# Patient Record
Sex: MALE | Race: BLACK OR AFRICAN AMERICAN | NOT HISPANIC OR LATINO | Employment: OTHER | URBAN - METROPOLITAN AREA
[De-identification: names, ages, dates, MRNs, and addresses within clinical notes are randomized per-mention and may not be internally consistent; named-entity substitution may affect disease eponyms.]

---

## 2017-01-31 ENCOUNTER — ALLSCRIPTS OFFICE VISIT (OUTPATIENT)
Dept: OTHER | Facility: OTHER | Age: 57
End: 2017-01-31

## 2017-06-30 ENCOUNTER — ALLSCRIPTS OFFICE VISIT (OUTPATIENT)
Dept: OTHER | Facility: OTHER | Age: 57
End: 2017-06-30

## 2017-07-13 ENCOUNTER — ALLSCRIPTS OFFICE VISIT (OUTPATIENT)
Dept: OTHER | Facility: OTHER | Age: 57
End: 2017-07-13

## 2017-07-13 DIAGNOSIS — Z45.02 ENCOUNTER FOR ADJUSTMENT OR MANAGEMENT OF AUTOMATIC IMPLANTABLE CARDIOVERTER-DEFIBRILLATOR: ICD-10-CM

## 2017-08-01 ENCOUNTER — ALLSCRIPTS OFFICE VISIT (OUTPATIENT)
Dept: OTHER | Facility: OTHER | Age: 57
End: 2017-08-01

## 2017-08-03 ENCOUNTER — HOSPITAL ENCOUNTER (OUTPATIENT)
Dept: NON INVASIVE DIAGNOSTICS | Facility: CLINIC | Age: 57
Discharge: HOME | End: 2017-08-03
Payer: MEDICARE

## 2017-08-03 DIAGNOSIS — Z45.02 ENCOUNTER FOR ADJUSTMENT OR MANAGEMENT OF AUTOMATIC IMPLANTABLE CARDIOVERTER-DEFIBRILLATOR: ICD-10-CM

## 2017-08-03 PROCEDURE — 93306 TTE W/DOPPLER COMPLETE: CPT

## 2017-08-11 ENCOUNTER — TELEPHONE (OUTPATIENT)
Dept: SURGERY | Facility: HOSPITAL | Age: 57
End: 2017-08-11

## 2017-08-11 RX ORDER — SODIUM CHLORIDE 9 MG/ML
50 INJECTION, SOLUTION INTRAVENOUS CONTINUOUS
Status: CANCELLED | OUTPATIENT
Start: 2017-08-11

## 2017-08-14 ENCOUNTER — ANESTHESIA EVENT (OUTPATIENT)
Dept: NON INVASIVE DIAGNOSTICS | Facility: HOSPITAL | Age: 57
End: 2017-08-14

## 2017-08-14 ENCOUNTER — HOSPITAL ENCOUNTER (OUTPATIENT)
Dept: NON INVASIVE DIAGNOSTICS | Facility: HOSPITAL | Age: 57
Discharge: NURSING, REHAB OR LONG-TERM CARE | End: 2017-08-14
Attending: INTERNAL MEDICINE | Admitting: INTERNAL MEDICINE
Payer: MEDICARE

## 2017-08-14 ENCOUNTER — ANESTHESIA (OUTPATIENT)
Dept: NON INVASIVE DIAGNOSTICS | Facility: HOSPITAL | Age: 57
End: 2017-08-14

## 2017-08-14 ENCOUNTER — HOSPITAL ENCOUNTER (OUTPATIENT)
Dept: RADIOLOGY | Facility: HOSPITAL | Age: 57
Discharge: HOME | End: 2017-08-14
Payer: MEDICARE

## 2017-08-14 VITALS
TEMPERATURE: 95.4 F | RESPIRATION RATE: 18 BRPM | HEART RATE: 54 BPM | DIASTOLIC BLOOD PRESSURE: 80 MMHG | SYSTOLIC BLOOD PRESSURE: 132 MMHG | HEIGHT: 73 IN | OXYGEN SATURATION: 98 %

## 2017-08-14 DIAGNOSIS — Z45.02 ENCOUNTER DUE TO AICD AT END OF BATTERY LIFE: ICD-10-CM

## 2017-08-14 LAB
BASE EXCESS BLDA CALC-SCNC: 4 MMOL/L (ref -2–3)
BASOPHILS # BLD AUTO: 0.02 THOUSANDS/ΜL (ref 0–0.1)
BASOPHILS NFR BLD AUTO: 0 % (ref 0–1)
CA-I BLD-SCNC: 1.29 MMOL/L (ref 1.12–1.32)
EOSINOPHIL # BLD AUTO: 0.05 THOUSAND/ΜL (ref 0–0.61)
EOSINOPHIL NFR BLD AUTO: 1 % (ref 0–6)
ERYTHROCYTE [DISTWIDTH] IN BLOOD BY AUTOMATED COUNT: 14.4 % (ref 11.6–15.1)
GLUCOSE SERPL-MCNC: 108 MG/DL (ref 65–140)
GLUCOSE SERPL-MCNC: 95 MG/DL (ref 65–140)
HCO3 BLDA-SCNC: 31.1 MMOL/L (ref 24–30)
HCT VFR BLD AUTO: 43.1 % (ref 36.5–49.3)
HCT VFR BLD CALC: 45 % (ref 36.5–49.3)
HGB BLD-MCNC: 15.3 G/DL (ref 12–17)
HGB BLDA-MCNC: 15.3 G/DL (ref 12–17)
LYMPHOCYTES # BLD AUTO: 1.8 THOUSANDS/ΜL (ref 0.6–4.47)
LYMPHOCYTES NFR BLD AUTO: 37 % (ref 14–44)
MCH RBC QN AUTO: 31.7 PG (ref 26.8–34.3)
MCHC RBC AUTO-ENTMCNC: 35.5 G/DL (ref 31.4–37.4)
MCV RBC AUTO: 89 FL (ref 82–98)
MONOCYTES # BLD AUTO: 0.67 THOUSAND/ΜL (ref 0.17–1.22)
MONOCYTES NFR BLD AUTO: 14 % (ref 4–12)
NEUTROPHILS # BLD AUTO: 2.27 THOUSANDS/ΜL (ref 1.85–7.62)
NEUTS SEG NFR BLD AUTO: 48 % (ref 43–75)
NRBC BLD AUTO-RTO: 0 /100 WBCS
PCO2 BLD: 33 MMOL/L (ref 21–32)
PCO2 BLD: 55.5 MM HG (ref 42–50)
PH BLD: 7.36 [PH] (ref 7.3–7.4)
PLATELET # BLD AUTO: 139 THOUSANDS/UL (ref 149–390)
PMV BLD AUTO: 12.5 FL (ref 8.9–12.7)
PO2 BLD: 21 MM HG (ref 35–45)
POTASSIUM BLD-SCNC: 4.3 MMOL/L (ref 3.5–5.3)
RBC # BLD AUTO: 4.83 MILLION/UL (ref 3.88–5.62)
SAO2 % BLD FROM PO2: 32 % (ref 95–98)
SODIUM BLD-SCNC: 142 MMOL/L (ref 136–145)
SPECIMEN SOURCE: ABNORMAL
WBC # BLD AUTO: 4.81 THOUSAND/UL (ref 4.31–10.16)

## 2017-08-14 PROCEDURE — 85014 HEMATOCRIT: CPT

## 2017-08-14 PROCEDURE — 85025 COMPLETE CBC W/AUTO DIFF WBC: CPT | Performed by: INTERNAL MEDICINE

## 2017-08-14 PROCEDURE — 82803 BLOOD GASES ANY COMBINATION: CPT

## 2017-08-14 PROCEDURE — 33263 RMVL & RPLCMT DFB GEN 2 LEAD: CPT | Performed by: INTERNAL MEDICINE

## 2017-08-14 PROCEDURE — 84295 ASSAY OF SERUM SODIUM: CPT

## 2017-08-14 PROCEDURE — 71010 HB CHEST X-RAY 1 VIEW FRONTAL (PORTABLE): CPT

## 2017-08-14 PROCEDURE — 82947 ASSAY GLUCOSE BLOOD QUANT: CPT

## 2017-08-14 PROCEDURE — 84132 ASSAY OF SERUM POTASSIUM: CPT

## 2017-08-14 PROCEDURE — 82948 REAGENT STRIP/BLOOD GLUCOSE: CPT

## 2017-08-14 PROCEDURE — C1721 AICD, DUAL CHAMBER: HCPCS

## 2017-08-14 PROCEDURE — 82330 ASSAY OF CALCIUM: CPT

## 2017-08-14 PROCEDURE — 93005 ELECTROCARDIOGRAM TRACING: CPT | Performed by: PHYSICIAN ASSISTANT

## 2017-08-14 RX ORDER — METFORMIN HYDROCHLORIDE 500 MG/1
500 TABLET, EXTENDED RELEASE ORAL 2 TIMES DAILY
Status: DISCONTINUED | OUTPATIENT
Start: 2017-08-15 | End: 2017-08-14 | Stop reason: CLARIF

## 2017-08-14 RX ORDER — ONDANSETRON 2 MG/ML
4 INJECTION INTRAMUSCULAR; INTRAVENOUS ONCE AS NEEDED
Status: DISCONTINUED | OUTPATIENT
Start: 2017-08-14 | End: 2017-08-14 | Stop reason: HOSPADM

## 2017-08-14 RX ORDER — ATORVASTATIN CALCIUM 20 MG/1
5 TABLET, FILM COATED ORAL DAILY
COMMUNITY
End: 2018-08-28 | Stop reason: SDUPTHER

## 2017-08-14 RX ORDER — FAMOTIDINE 40 MG/5ML
20 POWDER, FOR SUSPENSION ORAL 2 TIMES DAILY
Status: DISCONTINUED | OUTPATIENT
Start: 2017-08-14 | End: 2017-08-14 | Stop reason: HOSPADM

## 2017-08-14 RX ORDER — SODIUM CHLORIDE 9 MG/ML
50 INJECTION, SOLUTION INTRAVENOUS CONTINUOUS
Status: DISCONTINUED | OUTPATIENT
Start: 2017-08-14 | End: 2017-08-14

## 2017-08-14 RX ORDER — ACETAMINOPHEN 325 MG/1
650 TABLET ORAL EVERY 4 HOURS PRN
Status: DISCONTINUED | OUTPATIENT
Start: 2017-08-14 | End: 2017-08-14 | Stop reason: HOSPADM

## 2017-08-14 RX ORDER — TRAMADOL HYDROCHLORIDE 50 MG/1
50 TABLET ORAL EVERY 4 HOURS PRN
Status: DISCONTINUED | OUTPATIENT
Start: 2017-08-14 | End: 2017-08-14 | Stop reason: HOSPADM

## 2017-08-14 RX ORDER — ACETAMINOPHEN 325 MG/1
650 TABLET ORAL EVERY 6 HOURS PRN
Qty: 30 TABLET | Refills: 0
Start: 2017-08-14

## 2017-08-14 RX ORDER — MIDAZOLAM HYDROCHLORIDE 1 MG/ML
INJECTION INTRAMUSCULAR; INTRAVENOUS AS NEEDED
Status: DISCONTINUED | OUTPATIENT
Start: 2017-08-14 | End: 2017-08-14 | Stop reason: SURG

## 2017-08-14 RX ORDER — FAMOTIDINE 40 MG/5ML
20 POWDER, FOR SUSPENSION ORAL 2 TIMES DAILY
Qty: 50 ML | Refills: 0
Start: 2017-08-14 | End: 2018-02-21

## 2017-08-14 RX ORDER — ATORVASTATIN CALCIUM 10 MG/1
20 TABLET, FILM COATED ORAL
Status: DISCONTINUED | OUTPATIENT
Start: 2017-08-14 | End: 2017-08-14 | Stop reason: HOSPADM

## 2017-08-14 RX ORDER — GENTAMICIN SULFATE 40 MG/ML
INJECTION, SOLUTION INTRAMUSCULAR; INTRAVENOUS CODE/TRAUMA/SEDATION MEDICATION
Status: COMPLETED | OUTPATIENT
Start: 2017-08-14 | End: 2017-08-14

## 2017-08-14 RX ORDER — LIDOCAINE HYDROCHLORIDE 10 MG/ML
INJECTION, SOLUTION INFILTRATION; PERINEURAL AS NEEDED
Status: DISCONTINUED | OUTPATIENT
Start: 2017-08-14 | End: 2017-08-14 | Stop reason: SURG

## 2017-08-14 RX ORDER — ONDANSETRON 2 MG/ML
INJECTION INTRAMUSCULAR; INTRAVENOUS AS NEEDED
Status: DISCONTINUED | OUTPATIENT
Start: 2017-08-14 | End: 2017-08-14 | Stop reason: SURG

## 2017-08-14 RX ORDER — ATORVASTATIN CALCIUM 10 MG/1
20 TABLET, FILM COATED ORAL DAILY
Status: DISCONTINUED | OUTPATIENT
Start: 2017-08-14 | End: 2017-08-14

## 2017-08-14 RX ORDER — RANITIDINE 15 MG/ML
75 SOLUTION ORAL 2 TIMES DAILY
Status: DISCONTINUED | OUTPATIENT
Start: 2017-08-14 | End: 2017-08-14 | Stop reason: CLARIF

## 2017-08-14 RX ORDER — GABAPENTIN 250 MG/5ML
300 SOLUTION ORAL DAILY
Status: DISCONTINUED | OUTPATIENT
Start: 2017-08-14 | End: 2017-08-14 | Stop reason: HOSPADM

## 2017-08-14 RX ORDER — WARFARIN SODIUM 4 MG/1
4 TABLET ORAL
COMMUNITY

## 2017-08-14 RX ORDER — METFORMIN HYDROCHLORIDE EXTENDED-RELEASE TABLETS 500 MG/1
250 TABLET, FILM COATED, EXTENDED RELEASE ORAL 2 TIMES DAILY
COMMUNITY

## 2017-08-14 RX ORDER — LIDOCAINE HYDROCHLORIDE 10 MG/ML
INJECTION, SOLUTION INFILTRATION; PERINEURAL CODE/TRAUMA/SEDATION MEDICATION
Status: COMPLETED | OUTPATIENT
Start: 2017-08-14 | End: 2017-08-14

## 2017-08-14 RX ORDER — GABAPENTIN 250 MG/5ML
6 SOLUTION ORAL DAILY
COMMUNITY

## 2017-08-14 RX ORDER — PROPOFOL 10 MG/ML
INJECTION, EMULSION INTRAVENOUS AS NEEDED
Status: DISCONTINUED | OUTPATIENT
Start: 2017-08-14 | End: 2017-08-14 | Stop reason: SURG

## 2017-08-14 RX ORDER — RANITIDINE 15 MG/ML
5 SOLUTION ORAL 2 TIMES DAILY
COMMUNITY
End: 2017-08-14 | Stop reason: HOSPADM

## 2017-08-14 RX ORDER — WARFARIN SODIUM 2.5 MG/1
2.5 TABLET ORAL
COMMUNITY

## 2017-08-14 RX ADMIN — PROPOFOL 160 MG: 10 INJECTION, EMULSION INTRAVENOUS at 08:24

## 2017-08-14 RX ADMIN — CEFAZOLIN SODIUM 2000 MG: 2 SOLUTION INTRAVENOUS at 08:42

## 2017-08-14 RX ADMIN — SODIUM CHLORIDE 50 ML/HR: 0.9 INJECTION, SOLUTION INTRAVENOUS at 07:29

## 2017-08-14 RX ADMIN — LIDOCAINE HYDROCHLORIDE 16 ML: 10 INJECTION, SOLUTION INFILTRATION; PERINEURAL at 08:47

## 2017-08-14 RX ADMIN — GENTAMICIN SULFATE 80 MG: 40 INJECTION, SOLUTION INTRAMUSCULAR; INTRAVENOUS at 09:27

## 2017-08-14 RX ADMIN — LIDOCAINE HYDROCHLORIDE 60 MG: 10 INJECTION, SOLUTION INFILTRATION; PERINEURAL at 08:24

## 2017-08-14 RX ADMIN — MIDAZOLAM HYDROCHLORIDE 2 MG: 1 INJECTION, SOLUTION INTRAMUSCULAR; INTRAVENOUS at 10:01

## 2017-08-14 RX ADMIN — ONDANSETRON HYDROCHLORIDE 4 MG: 2 INJECTION, SOLUTION INTRAVENOUS at 09:51

## 2017-08-15 LAB
ATRIAL RATE: 300 BPM
QRS AXIS: -52 DEGREES
QRSD INTERVAL: 100 MS
QT INTERVAL: 410 MS
QTC INTERVAL: 451 MS
T WAVE AXIS: 54 DEGREES
VENTRICULAR RATE: 73 BPM

## 2017-09-07 ENCOUNTER — ALLSCRIPTS OFFICE VISIT (OUTPATIENT)
Dept: OTHER | Facility: OTHER | Age: 57
End: 2017-09-07

## 2017-12-08 ENCOUNTER — GENERIC CONVERSION - ENCOUNTER (OUTPATIENT)
Dept: OTHER | Facility: OTHER | Age: 57
End: 2017-12-08

## 2017-12-08 ENCOUNTER — ALLSCRIPTS OFFICE VISIT (OUTPATIENT)
Dept: OTHER | Facility: OTHER | Age: 57
End: 2017-12-08

## 2018-01-13 VITALS
HEART RATE: 55 BPM | HEIGHT: 73 IN | BODY MASS INDEX: 28.76 KG/M2 | SYSTOLIC BLOOD PRESSURE: 110 MMHG | WEIGHT: 217 LBS | DIASTOLIC BLOOD PRESSURE: 70 MMHG

## 2018-01-13 NOTE — PROGRESS NOTES
Assessment  Assessed    1  Atrial fibrillation (427 31) (I48 91)   2  Cardiomyopathy (425 4) (I42 9)   3  Hyperlipidemia (272 4) (E78 5)   4  Implantable Cardioverter-Defibrillator   5  Ventricular tachycardia (427 1) (I47 2)    Plan  Atrial fibrillation, Cardiomyopathy    · Follow-up visit in 6 months Evaluation and Treatment  Follow-up  Status: Hold For -  Scheduling  Requested for: 05Zra8167   Ordered; For: Atrial fibrillation, Cardiomyopathy; Ordered By: Soledad Addison Performed:  Due: 68ZJQ4494    Discussion/Summary  Cardiology Discussion Summary Free Text Note Form [facility] Chap[facility]: It is my impression that the patient appears to be compensated with the diagnosis of a non-ischemic cardiomyopathy  He is not requiring diuretics  His optivol readings have been normal He is in atrial fibrillation but is on warfarin for stroke prophylaxis  He will be maintained on both beta blockers and ACE inhibitors in light of his left ventricular systolic dysfunction  He is no longer on amiodarone for suppression of ventricular tachycardia  I am not sure why this was stopped  He had no VT on his recent interrogation and considering the risks of the drug we will forego restarting it at this time  If he develops VT or shocks I can restart it  He will remain on atorvastatin for hyperlipidemia  I will see him again in 6 months time  Chief Complaint  Chief Complaint Free Text Note Form: 6 moth f/u-for dilated cardiomyopathy which is nonischemic,, ventricular tachycardia with AICD  in situ and chronic atrial fibrillation and embolic stroke hemiparesis and aphasia   Chief Complaint Chronic Condition [facility]: Patient is here today for follow up of chronic conditions described in HPI  History of Present Illness  Cardiology HPI Free Text Note Form [facility]: Since his last visit he denies chest pain, shortness of breath, edema, palpitations or lightheadedness  He is somewhat limited in his history        Review of Systems  Cardiology Male ROS:     Cardiac: rhythm problems, but no chest pain, no fainting/blackouts, no heart murmur present, no signs of swelling and no palpitations present  Skin: No complaints of nonhealing sores or skin rash  Genitourinary: No complaints of recurrent urinary tract infections, frequent urination at night, difficult urination, blood in urine, kidney stones, loss of bladder control, no kidney or prostate problems, no erectile dysfunction  Psychological: No complaints of feeling depressed, anxiety, panic attacks, or difficulty concentrating  General: No complaints of trouble sleeping, lack of energy, fatigue, appetite changes, weight changes, fever, frequent infections, or night sweats  Respiratory: No complaints of shortness of breath, cough with sputum, or wheezing  HEENT: No complaints of serious problems, hearing problems, nose problems, throat problems, or snoring  Gastrointestinal: No complaints of liver problems, nausea, vomiting, heartburn, constipation, bloody stools, diarrhea, problems swallowing, adbominal pain, or rectal bleeding  Hematologic: No complaints of bleeding disorders, anemia, blood clots, or excessive brusing  Neurological: No complaints of numbness, tingling, dizziness, weakness, seizures, headaches, syncope or fainting, AM fatigue, daytime sleepiness, no witnessed apnea episodes  Musculoskeletal: No complaints of arthritis, back pain, or painfull swelling  ROS Reviewed:   ROS reviewed  Active Problems  Problems    1  Atrial fibrillation (427 31) (I48 91)   2  Cardiomyopathy (425 4) (I42 9)   3  Chest pain (786 50) (R07 9)   4  Gastric ulcer (531 90) (K25 9)   5  Hyperlipidemia (272 4) (E78 5)   6  Implantable Cardioverter-Defibrillator   7  Shortness of breath (786 05) (R06 02)   8  Stroke syndrome (434 91) (I63 9)   9  Ventricular tachycardia (427 1) (I47 2)    Past Medical History  Problems    1  History of Cirrhosis (571 5) (K74 60)   2   History of bipolar disorder (V11 1) (Z86 59)   3  History of gastroesophageal reflux (GERD) (V12 79) (Z87 19)   4  History of gastrointestinal hemorrhage (V12 79) (Z87 19)   5  History of Pulmonary Embolism (V12 55)   6  History of Stroke Syndrome (436)  Active Problems And Past Medical History Reviewed: The active problems and past medical history were reviewed and updated today  Surgical History  Problems    1  History of Implantable Cardioverter-Defibrillator   2  Implantable Cardioverter-Defibrillator   3  History of Interruption Inferior Vena Cava [place] Filter Placement   4  History of Knee Surgery   5  History of Umbilical Hernia Repair  Surgical History Reviewed: The surgical history was reviewed and updated today  Family History  Family History    1  Denied: Family history of Acute Myocardial Infarction   2  Family history of Denial Of Any Significant Medical History   3  Denied: Family history of Reported Family History Of Early Sudden Deaths   4  Denied: Family history of Reported Family History Of Heart Disease  Family History Reviewed: The family history was reviewed and updated today  Social History  Problems    · Former smoker (V15 82) (Q56 444)   · No alcohol use   · Recovering Alcoholic   · Tobacco use (305 1) (Z72 0)  Social History Reviewed: The social history was reviewed and updated today  The social history was reviewed and is unchanged  Current Meds   1  Acetaminophen 325 MG Oral Tablet; TAKE 1 TO 2 TABLETS EVERY 4 HOURS AS   NEEDED; Therapy: 97DAT7333 to Recorded   2  Atorvastatin Calcium 20 MG Oral Tablet; 1 tablet daily; Therapy: 68PQX2287 to Recorded   3  Bisacodyl 10 MG Rectal Suppository; INSERT 1 SUPPOSITORY RECTALLY ONCE DAILY; Therapy: 03VRW7052 to Recorded   4  Chlorophyll 3-0 6 MG Oral Tablet; Therapy: 79MIR1675 to Recorded   5  Daily Value Multivitamin Oral Tablet; Take 1 tablet daily Recorded   6  Ipratropium Bromide 0 02 % Inhalation Solution;    Therapy: 36MJB2873 to Recorded   7  Lisinopril 2 5 MG Oral Tablet; Therapy: 86CYK1850 to Recorded   8  Loratadine 10 MG Oral Tablet; 1 tablet daily; Therapy: 62IGU5500 to Recorded   9  MetFORMIN HCl - 500 MG Oral Tablet; 0 5 tablets twice daily; Therapy: 24AER7911 to Recorded   10  Metoprolol Tartrate 25 MG Oral Tablet; 1 TABLET TWICE DAILY; Therapy: 23MXP2858 to Recorded   11  Milk of Magnesia 1200 MG/15ML Oral Suspension; Therapy: 55FJK1720 to Recorded   12  Multivitamins Oral Capsule; Therapy: 94NRQ9734 to Recorded   13  Neurontin 250 MG/5ML Oral Solution; 8ML every evening; Therapy: 11QGZ0403 to Recorded   14  Neurontin 250 MG/5ML Oral Solution; Therapy: 66VYD1356 to Recorded   15  Ranitidine HCl - 15 MG/ML Oral Syrup; Therapy: 62EFQ5949 to Recorded   16  Sertraline HCl - 25 MG Oral Tablet; TAKE 1 TABLET DAILY; Therapy: 49KUM8735 to Recorded   17  Warfarin Sodium 5 MG Oral Tablet; tke daily as directed by MD;    Therapy: 40XFY2049 to (Hemant Miner)  Requested for: 08Apr2014; Last    Rx:08Apr2014 Ordered  Medication List Reviewed: The medication list was reviewed and updated today  Allergies  Medication    1  No Known Drug Allergies    Vitals  Vital Signs [Data Includes: Current Encounter]    Recorded: 12Jan2016 10:27AM   Heart Rate 72, L Radial   Pulse Quality Normal, L Radial   Respiration 18   Respiration Quality Normal   Systolic 368, LUE, Sitting   Diastolic 72, LUE, Sitting   Height 6 ft 1 in   Weight 221 lb 6 oz   BMI Calculated 29 21   BSA Calculated 2 25     Physical Exam    Constitutional   General appearance: Abnormal   obese middle aged black male with aphasis  Eyes   Conjunctiva and Sclera examination: Conjunctiva pink, sclera anicteric  Ears, Nose, Mouth, and Throat - Oropharynx: Clear, nares are clear, mucous membranes are moist    Neck   Neck and thyroid: Normal, supple, trachea midline, no thyromegaly      Pulmonary   Auscultation of lungs: Abnormal   somewhat decreased breath sounds w/o wheezing rhonchi or rales  Cardiovascular   Auscultation of heart: Normal rate and rhythm, normal S1 and S2, no murmurs  Carotid pulses: Normal, 2+ bilaterally  Pedal pulses: Normal, 2+ bilaterally  Examination of extremities for edema and/or varicosities: Normal     Chest - Chest: Normal    Abdomen   Abdomen: Non-tender and no distention  Liver and spleen: No hepatomegaly or splenomegaly  Musculoskeletal Gait and station: Abnormal  left sided weakness  Digits and nails: Normal without clubbing or cyanosis  Inspection/palpation of joints, bones, and muscles: Normal, ROM normal     Skin - Skin and subcutaneous tissue: Normal without rashes or lesions  Skin is warm and well perfused, normal turgor  Neurologic - Cranial nerves: Abnormal  aphasia  Sensation: Abnormal  left sided weakness     Psychiatric - Orientation to person, place, and time: Normal  Mood and affect: Normal       Future Appointments    Date/Time Provider Specialty Site   06/24/2016 01:00 PM Cardiology, Device Clinic [facility] CARDIOLOGY  [facility]   03/23/2016 01:00 PM Cardiology, Device Remote  [address]     Signatures   Electronically signed by : MOOK Calderon ; Jan 12 2016 11:03AM EST                       (Author)

## 2018-01-14 VITALS
HEIGHT: 73 IN | SYSTOLIC BLOOD PRESSURE: 96 MMHG | DIASTOLIC BLOOD PRESSURE: 68 MMHG | RESPIRATION RATE: 16 BRPM | HEART RATE: 72 BPM

## 2018-01-14 VITALS
HEART RATE: 60 BPM | WEIGHT: 229.38 LBS | DIASTOLIC BLOOD PRESSURE: 70 MMHG | SYSTOLIC BLOOD PRESSURE: 88 MMHG | BODY MASS INDEX: 30.4 KG/M2 | HEIGHT: 73 IN

## 2018-02-15 RX ORDER — LISINOPRIL 2.5 MG/1
TABLET ORAL
COMMUNITY
Start: 2014-11-25 | End: 2018-08-28 | Stop reason: ALTCHOICE

## 2018-02-20 PROBLEM — I48.21 PERMANENT ATRIAL FIBRILLATION (HCC): Status: ACTIVE | Noted: 2018-02-20

## 2018-02-20 PROBLEM — E78.2 MIXED HYPERLIPIDEMIA: Status: ACTIVE | Noted: 2018-02-20

## 2018-02-20 PROBLEM — I47.20 VENTRICULAR TACHYCARDIA: Status: ACTIVE | Noted: 2018-02-20

## 2018-02-20 PROBLEM — I42.6 ALCOHOLIC CARDIOMYOPATHY (HCC): Status: ACTIVE | Noted: 2018-02-20

## 2018-02-20 PROBLEM — Z95.810 ICD (IMPLANTABLE CARDIOVERTER-DEFIBRILLATOR) IN PLACE: Status: ACTIVE | Noted: 2018-02-20

## 2018-02-20 PROBLEM — I47.2 VENTRICULAR TACHYCARDIA (HCC): Status: ACTIVE | Noted: 2018-02-20

## 2018-02-21 ENCOUNTER — OFFICE VISIT (OUTPATIENT)
Dept: CARDIOLOGY CLINIC | Facility: CLINIC | Age: 58
End: 2018-02-21
Payer: MEDICARE

## 2018-02-21 VITALS
WEIGHT: 210.8 LBS | SYSTOLIC BLOOD PRESSURE: 126 MMHG | BODY MASS INDEX: 27.94 KG/M2 | HEIGHT: 73 IN | RESPIRATION RATE: 18 BRPM | HEART RATE: 72 BPM | DIASTOLIC BLOOD PRESSURE: 84 MMHG

## 2018-02-21 DIAGNOSIS — I47.2 VENTRICULAR TACHYCARDIA (HCC): ICD-10-CM

## 2018-02-21 DIAGNOSIS — E78.2 MIXED HYPERLIPIDEMIA: ICD-10-CM

## 2018-02-21 DIAGNOSIS — I42.6 ALCOHOLIC CARDIOMYOPATHY (HCC): ICD-10-CM

## 2018-02-21 DIAGNOSIS — I48.21 PERMANENT ATRIAL FIBRILLATION (HCC): Primary | ICD-10-CM

## 2018-02-21 DIAGNOSIS — Z95.810 ICD (IMPLANTABLE CARDIOVERTER-DEFIBRILLATOR) IN PLACE: ICD-10-CM

## 2018-02-21 PROCEDURE — 99213 OFFICE O/P EST LOW 20 MIN: CPT | Performed by: INTERNAL MEDICINE

## 2018-02-21 RX ORDER — RANITIDINE 15 MG/ML
SOLUTION ORAL 2 TIMES DAILY
COMMUNITY
End: 2020-09-18

## 2018-02-21 NOTE — PROGRESS NOTES
Cardiology Follow Up    Cory Elliott  1960  [id number]  [address] [address]    Reason for visit:  Six-month follow-up for dilated cardiomyopathy which is nonischemic, ventricular tachycardia with AICD in situ, chronic atrial fibrillation, history of embolic stroke with hemiparesis and aphasia and hyperlipidemia  1  Permanent atrial fibrillation ([address] )     2  Alcoholic cardiomyopathy ([address] )     3  Ventricular tachycardia ([address] )     4  ICD (implantable cardioverter-defibrillator) in place     5  Mixed hyperlipidemia         Interval History:   Since the patient's last visit, he denies chest pain, shortness of breath, edema or lightheadedness  He denies palpitations  Patient Active Problem List   Diagnosis    Alcoholic cardiomyopathy ([address] )    Permanent atrial fibrillation ([address] )    ICD (implantable cardioverter-defibrillator) in place    Ventricular tachycardia ([address] )    Mixed hyperlipidemia     Past Medical History:   Diagnosis Date    Atrial fibrillation ([address] )     Bipolar affective disorder ([address] )     Cardiomyopathy ([address] )     Depression     Diabetes mellitus ([address] )     Dysphagia     Hyperlipidemia     Hypertension     ICD (implantable cardioverter-defibrillator) in place     Pneumothorax     Stroke ([address] )     Upper GI bleed     Urinary incontinence      Social History     Social History    Marital status:      Spouse name: N/A    Number of children: N/A    Years of education: N/A     Occupational History    Not on file       Social History Main Topics    Smoking status: Former Smoker    Smokeless tobacco: Never Used    Alcohol use No      Comment: former abuse    Drug use: No    Sexual activity: Not on file     Other Topics Concern    Not on file     Social History Narrative    No narrative on file      Family History   Problem Relation Age of Onset    Coronary artery disease Family      Past Surgical History:   Procedure Laterality Date    CARDIAC DEFIBRILLATOR PLACEMENT      IVC FILTER INSERTION      KNEE SURGERY      UMBILICAL HERNIA REPAIR         Current Outpatient Prescriptions:     acetaminophen (TYLENOL) 325 mg tablet, Take 2 tablets by mouth every 6 (six) hours as needed for mild pain, Disp: 30 tablet, Rfl: 0    atorvastatin (LIPITOR) 20 mg tablet, 20 mg by Per G Tube route daily, Disp: , Rfl:     gabapentin (NEURONTIN) 250 mg/5 mL solution, 6 mL by Per G Tube route daily, Disp: , Rfl:     lisinopril (ZESTRIL) 2 5 mg tablet, Take by mouth, Disp: , Rfl:     magnesium hydroxide (MILK OF MAGNESIA CONCENTRATE) 2400 mg/10 mL SUSP concentrated oral suspension, Take by mouth, Disp: , Rfl:     metFORMIN (FORTAMET) 500 MG (OSM) 24 hr tablet, Take 250 mg by mouth 2 (two) times a day Give per G tube, Disp: , Rfl:     metoprolol tartrate (LOPRESSOR) 25 mg tablet, 25 mg by Per G Tube route every 12 (twelve) hours, Disp: , Rfl:     ranitidine (ZANTAC) 15 mg/mL syrup, Take by mouth 2 (two) times a day, Disp: , Rfl:     warfarin (COUMADIN) 2 5 mg tablet, 2 5 mg by Per G Tube route daily Give with 4mg = total 6 5, Disp: , Rfl:     warfarin (COUMADIN) 4 mg tablet, 4 mg by Per G Tube route daily Give with 2 5 mg dose = total 6 5mg, Disp: , Rfl:   No Known Allergies        Review of Systems:  Review of Systems   Constitutional: Negative for appetite change and fatigue  Cardiovascular: Negative for chest pain, palpitations and leg swelling  Gastrointestinal: Negative for abdominal pain, constipation and diarrhea  Genitourinary: Negative for frequency  Psychiatric/Behavioral: Negative for agitation, behavioral problems, confusion and decreased concentration         Physical Exam:  Vitals:    02/21/18 0752   BP: 126/84   BP Location: Left arm   Patient Position: Sitting   Cuff Size: Large   Pulse: 72   Resp: 18   Weight: 95 6 kg (210 lb 12 8 oz)   Height: 6' 1" (1 854 m)     Physical Exam   Constitutional: He appears well-developed and well-nourished  No distress  HENT:   Head: Normocephalic and atraumatic  Mouth/Throat: No oropharyngeal exudate  Eyes: Conjunctivae are normal  No scleral icterus  Neck: Neck supple  Normal carotid pulses and no JVD present  Carotid bruit is not present  No thyromegaly present  Cardiovascular: Normal rate, normal heart sounds and intact distal pulses  An irregularly irregular rhythm present  Exam reveals no S3 and no S4  No murmur heard  No systolic murmur is present    No diastolic murmur is present   Pulses:       Dorsalis pedis pulses are 2+ on the right side, and 2+ on the left side  Posterior tibial pulses are 2+ on the right side, and 2+ on the left side  Pulmonary/Chest: He has decreased breath sounds in the right upper field, the right middle field, the right lower field, the left upper field, the left middle field and the left lower field  He has no wheezes  He has no rhonchi  He has no rales  Abdominal: Soft  He exhibits no mass  There is no hepatosplenomegaly  There is no tenderness  Musculoskeletal: He exhibits no edema  Neurological:   Expressive aphasia            Discussion/Summary:  1  Permanent atrial fibrillation  Rate well controlled with low-dose beta-blockers  On warfarin for stroke prophylaxis  2  Dilated cardiomyopathy, nonischemic  Last EF was mildly reduced in August (50-55%) likely improved from rate control and abstinence from ETOH  No evidence for congestive heart failure  Not requiring diuretics  Continue lisinopril and metoprolol in light of decreased ejection fraction  3  Ventricular tachycardia  No longer on amiodarone  Defibrillator checks have shown occasional nonsustained ventricular tachycardia but nothing requiring restarting this medication  4  ICD in situ  Ongoing surveillance with defibrillator checks  5  Mixed hyperlipidemia  Total cholesterol is 60   Will recommend reducing atorvastatin to 5 mg daily especially since there is no evidence for vascular disease      Follow-up 6 months    Ormartine Oropeza MD

## 2018-03-07 NOTE — PROGRESS NOTES
"  Discussion/Summary  Normal device function     Dependent at current rate    a fib     on coumadin  Results/Data  Cardiac Device Remote [id number] 06:13PM Katie Kaur     Test Name Result Flag Reference   MISCELLANEOUS COMMENT      CARELINK TRANSMISSION: BATTERY VOLTAGE ADEQUATE (10 YRS)  -67% (>40% VVIR @ 50 PPM)  ALL AVAILABLE LEAD PARAMETERS WITHIN NORMAL LIMITS  PT IN CHRONIC AF & ON WARFARIN & METOPROLOL  OPTI-VOL WITHIN NORMAL LIMITS  NORMAL DEVICE FUNCTION  GV   Cardiac Electrophysiology Report      [id number]  pdf   DEVICE TYPE ICD       Cardiac Electrophysiology Report [id number] 06:13PM Katie Kaur     Test Name Result Flag Reference   Cardiac Electrophysiology Report      [id number]  pdf     Signatures   Electronically signed by : Brandy Hall RN; Dec 11 2017  1:30PM EST                       (Author)    Electronically signed by : MOOK Cam ; Dec 14 2017 11:40AM EST                       (Author)    "

## 2018-03-07 NOTE — PROGRESS NOTES
"  Discussion/Summary  Normal device function      Results/Data  Results   Cardiac Device Remote 23Mar2016 02:59PM [facility]     Test Name Result Flag Reference   MISCELLANEOUS COMMENT      CARELINK TRANSMISSION: BATTERY STATUS "OK"   40 8%  ALL AVAILABLE LEAD PARAMETERS WITHIN NORMAL LIMITS  NO SIGNIFICANT HIGH RATE EPISODES  OPTI-VOL WITHIN NORMAL LIMITS  NORMAL DEVICE FUNCTION  NC   Cardiac Electrophysiology Report      [id number]}  pdf   DEVICE TYPE ICD       Cardiac Electrophysiology Report [id number] 02:59PM [facility]     Test Name Result Flag Reference   Cardiac Electrophysiology Report      [id number]  pdf     Signatures   Electronically signed by : Victorino Maguire, ; Mar 25 2016  1:35PM EST                       (Author)    Electronically signed by : MOOK Fernandez ; Mar 27 2016  9:12AM EST                       (Author)    "

## 2018-03-07 NOTE — PROGRESS NOTES
"  Discussion/Summary  Normal device function     Dependent at current rate  A fib, RVR  on coumadin       Results/Data  Cardiac Device In Clinic 07Sep2017 01:41PM Shanita Nunez     Test Name Result Flag Reference   MISCELLANEOUS COMMENT (Report)     DEVICE INTERROGATED IN THE [facility] OFFICE  BATTERY VOLTAGE ADEQUATE (10 3 YRS)  -55% (>40% VVIR @ 55 PPM)  ALL LEAD PARAMETERS WITHIN NORMAL LIMITS  ALL OTHER TESTING WITHIN NORMAL LIMITS  1 HIGH RATE- NS FOR 17 BEAT NSVT, AVG CL~240MS  NO THERAPIES  PT SLEEPING @ THE TIME  EF-50-55% (ECHO 8/3/17)  TASKED DR Meche Dyson W/ EPISODE  PT IN % OF TIME & ON WARFARIN & METOPROLOL  OPTIVOL INITIALIZING  NORMAL DEVICE FUNCTION  WOUND CHECK: INCISION CLEAN AND DRY WITH EDGES APPROXIMATED; STERISTIPS REMOVED; WOUND CARE AND RESTRICTIONS REVIEWED WITH PATIENT  GV   Cardiac Electrophysiology Report      [id number] [id number] [id number]  pdf   DEVICE TYPE ICD       Cardiac Electrophysiology Report 07Sep2017 01:41PM Shanita Nunez     Test Name Result Flag Reference   Cardiac Electrophysiology Report      [id number]  pdf     Cardiac Electrophysiology Report 07Sep2017 01:41PM Shanita Nunez     Test Name Result Flag Reference   Cardiac Electrophysiology Report      [id number]  pdf     Signatures   Electronically signed by : Mando Beck RN; Sep  7 2017 10:08AM EST                       (Author)    Electronically signed by : MOOK Valdovinos ; Sep 10 2017  8:34AM EST                       (Author)    "

## 2018-03-07 NOTE — PROGRESS NOTES
"  Discussion/Summary  Normal device function      Results/Data  Cardiac Device Remote [id number] 02:19PM Keegan Perez     Test Name Result Flag Reference   MISCELLANEOUS COMMENT      CARELINK TRANSMISSION: BATTERY VOLTAGE IS 2 62V LUCILA 2 63V HOWEVER HAS NOT INDICTATED LUCILA   48%  ALL AVAILABLE LEAD PARAMETERS WITHIN NORMAL LIMITS  NO SIGNIFICANT HIGH RATE EPISODES  OPTI-VOL WITHIN NORMAL LIMITS  NORMAL DEVICE FUNCTION  ---MORIN   Cardiac Electrophysiology Report      [id number]  pdf   DEVICE TYPE ICD       Cardiac Electrophysiology Report [id number] 02:19PM Keegan Perez     Test Name Result Flag Reference   Cardiac Electrophysiology Report      [id number]  pdf     Signatures   Electronically signed by : Shady Rodriguez, ; Sep 27 2016  4:21PM EST                       (Author)    Electronically signed by : William Hyman DO; Sep 27 2016  7:09PM EST                       (Author)    "

## 2018-03-07 NOTE — PROGRESS NOTES
"  Discussion/Summary  Normal device function     Will schedule [facility] clinic f/u to discuss ICD gen change and goals of care  Results/Data  Cardiac Device In Clinic [id number] 05:04PM Analilia Seen     Test Name Result Flag Reference   MISCELLANEOUS COMMENT (Report)     DEVICE INTERROGATED IN THE [place] OFFICE  BATTERY VOLTAGE @ RRT 10/16/16 AND NOW EOS  CHARGE TIME 17 2 SEC   39 5%  ALL LEAD PARAMETERS WITHIN NORMAL LIMITS  1 VT-NS 12 BEATS @ 286 BPM AND 1 ONGOING AF EPISODE  PT TAKES WARFARIN AND METOPROLOL SUCC  EF 15 +/- 5% (ECHO 2012)  NO RECENT THERAPY THROUGH DEVICE (LAST WAS IN 2013)  "INTEROGATE ALL" COMPLETED AND FULL TESTING OF RV LEAD  BATTERY ALERT PROG "OFF"  PT IS IN A WHEEL CHAIR AND HAS DIFFICULTY SPEAKING BUT SEEMS TO UNDERSTAND  EP SS NOTIFIED, AND APPT SCHEDULED 7/5 WITH HIM  [facility] INSTRUCTED TO NOTIFY POA OF APPT IF APPLICABLE  APPROPRIATELY FUNCTIONING ICD @ EOS  CP   Cardiac Electrophysiology Report      [id number] [id number] [id number]  pdf   DEVICE TYPE ICD       Cardiac Electrophysiology Report [id number] 05:04PM Analilia Donaldson     Test Name Result Flag Reference   Cardiac Electrophysiology Report      [id number]  pdf     Signatures   Electronically signed by : Ricco Cameron, ; Jun 30 2017  1:45PM EST                       (Author)    Electronically signed by : MOOK Troncoso ; Jun 30 2017  2:33PM EST                       (Author)    "

## 2018-03-07 NOTE — PROGRESS NOTES
"  Assessment    1  Cardiomyopathy (425 4) (I42 9)   2  Atrial fibrillation (427 31) (I48 91)   3  Implantable Cardioverter-Defibrillator    Discussion/Summary  Normal device function and Normal Optival/Corvue   Device Changes:  NONE  Comments: NORMAL DEVICE FUNCTION  NO HIGH RATE EPISODES  IN ATRIAL FIBRILLATION AND ON WARFARIN  Results/Data  Results   Cardiac Device In Clinic [id number] 05:19PM Alyce Klein     Test Name Result Flag Reference   MISCELLANEOUS COMMENT (Report)     DEVICE INTERROGATED IN THE [place] OFFICE; BATTERY VOLTAGE NEARING LUCILA (2 66V/ RRT = 2 63V) WILL SCHEDULE MONTHLY REMOTE BATTERY CHECKS;  = 30 5%; NO SIGNIFICANT HIGH RATE EPISODES; CHRONIC AF (100% BURDEN) - PT TAKES WARFARIN, METOPROLOL;ALL LEAD PARAMETERS TEST WITHIN NORMAL LIMITS/STABLE; OPTI-VOL WITHIN NORMAL LIMITS; NO PROGRAMMING CHANGES MADE TO DEVICE PARAMETERS; APPROPRIATELY FUNCTIONING ICD  eb   Cardiac Electrophysiology Report      [id number] [id number] [id number]_1  pdf   DEVICE TYPE ICD       Cardiac Electrophysiology Report [id number] 05:19PM Alyce Klein     Test Name Result Flag Reference   Cardiac Electrophysiology Report      [id number]  pdf     Signatures   Electronically signed by : Jessie Tompkins, ; Jun 24 2016  1:45PM EST                       (Author)    Electronically signed by : MOOK Trammell ; Jun 26 2016  4:39PM EST                       (Author)    "

## 2018-06-07 ENCOUNTER — REMOTE DEVICE CLINIC VISIT (OUTPATIENT)
Dept: CARDIOLOGY CLINIC | Facility: CLINIC | Age: 58
End: 2018-06-07
Payer: MEDICARE

## 2018-06-07 DIAGNOSIS — Z95.810 PRESENCE OF AUTOMATIC CARDIOVERTER/DEFIBRILLATOR (AICD): ICD-10-CM

## 2018-06-07 DIAGNOSIS — I48.21 PERMANENT ATRIAL FIBRILLATION (HCC): Primary | ICD-10-CM

## 2018-06-07 DIAGNOSIS — I47.2 VENTRICULAR TACHYCARDIA (HCC): ICD-10-CM

## 2018-06-07 PROCEDURE — 93296 REM INTERROG EVL PM/IDS: CPT | Performed by: INTERNAL MEDICINE

## 2018-06-07 PROCEDURE — 93295 DEV INTERROG REMOTE 1/2/MLT: CPT | Performed by: INTERNAL MEDICINE

## 2018-06-07 NOTE — PROGRESS NOTES
Results for orders placed or performed in visit on 06/07/18   Cardiac EP device report    Narrative    MDT DUAL CHAMBER ICD  CARELINK TRANSMISSION: BATTERY VOLTAGE ADEQUATE (9 5 YRS)  -70 5% (>40% VVIR @ 50 PPM)  ALL AVAILABLE LEAD PARAMETERS WITHIN NORMAL LIMITS   6 HR NS EPISODES SHOWING AF/RVR AND AF W/NSVT  DURATION 5 BEATS @ 220 ms & 8 BEATS @ 230 ms  EF 50-55% (ECHO 08/03/17)  PT IN CHRONIC AF & ON WARFARIN & METOPROLOL TART  AF BURDEN 100%  OPTI-VOL WITHIN NORMAL LIMITS  TASKED TO DR JAIDA CARL AT [place]   NORMAL DEVICE FUNCTION   CH/EB

## 2018-08-20 RX ORDER — CITALOPRAM 10 MG/1
10 TABLET ORAL
COMMUNITY

## 2018-08-28 ENCOUNTER — OFFICE VISIT (OUTPATIENT)
Dept: CARDIOLOGY CLINIC | Facility: CLINIC | Age: 58
End: 2018-08-28
Payer: MEDICARE

## 2018-08-28 ENCOUNTER — IN-CLINIC DEVICE VISIT (OUTPATIENT)
Dept: CARDIOLOGY CLINIC | Facility: CLINIC | Age: 58
End: 2018-08-28
Payer: MEDICARE

## 2018-08-28 VITALS
HEIGHT: 74 IN | WEIGHT: 207 LBS | BODY MASS INDEX: 26.56 KG/M2 | HEART RATE: 56 BPM | DIASTOLIC BLOOD PRESSURE: 62 MMHG | SYSTOLIC BLOOD PRESSURE: 110 MMHG

## 2018-08-28 DIAGNOSIS — I47.2 VENTRICULAR TACHYCARDIA (HCC): ICD-10-CM

## 2018-08-28 DIAGNOSIS — Z95.810 PRESENCE OF AUTOMATIC CARDIOVERTER/DEFIBRILLATOR (AICD): ICD-10-CM

## 2018-08-28 DIAGNOSIS — I42.6 ALCOHOLIC CARDIOMYOPATHY (HCC): Primary | ICD-10-CM

## 2018-08-28 DIAGNOSIS — E78.2 MIXED HYPERLIPIDEMIA: ICD-10-CM

## 2018-08-28 DIAGNOSIS — I48.21 PERMANENT ATRIAL FIBRILLATION (HCC): Primary | ICD-10-CM

## 2018-08-28 DIAGNOSIS — Z95.810 ICD (IMPLANTABLE CARDIOVERTER-DEFIBRILLATOR) IN PLACE: ICD-10-CM

## 2018-08-28 DIAGNOSIS — I48.21 PERMANENT ATRIAL FIBRILLATION (HCC): ICD-10-CM

## 2018-08-28 PROCEDURE — 99213 OFFICE O/P EST LOW 20 MIN: CPT | Performed by: INTERNAL MEDICINE

## 2018-08-28 PROCEDURE — 93283 PRGRMG EVAL IMPLANTABLE DFB: CPT | Performed by: INTERNAL MEDICINE

## 2018-08-28 PROCEDURE — 93000 ELECTROCARDIOGRAM COMPLETE: CPT | Performed by: INTERNAL MEDICINE

## 2018-08-28 RX ORDER — ATORVASTATIN CALCIUM 10 MG/1
5 TABLET, FILM COATED ORAL DAILY
Start: 2018-08-28
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
## 2018-08-28 NOTE — PROGRESS NOTES
Results for orders placed or performed in visit on 08/28/18   Cardiac EP device report    Narrative    MDT DUAL CHAMBER ICD  DEVICE INTERROGATED IN THE [place] OFFICE  BATTERY VOLTAGE ADEQUATE (9 3 YRS)  AP: 0%  : 68 3% (> 40% VVIR/55)  ALL LEAD PARAMETERS WITHIN NORMAL LIMITS  NO SIGNIFICANT HIGH RATE EPISODES   1 AT/AF (AF BURDEN 100%) AF IN PROGRESS (HX OF SAME)  PT TAKES WARFARIN, METOPROLOL TART  OPTI-VOL WITHIN NORMAL LIMITS  NO PROGRAMMING CHANGES MADE TO DEVICE PARAMETERS  PACEMAKER FUNCTIONING APPROPRIATELY    [address] [place] [address]

## 2018-11-30 ENCOUNTER — REMOTE DEVICE CLINIC VISIT (OUTPATIENT)
Dept: CARDIOLOGY CLINIC | Facility: CLINIC | Age: 58
End: 2018-11-30
Payer: MEDICARE

## 2018-11-30 DIAGNOSIS — Z95.810 AICD (AUTOMATIC CARDIOVERTER/DEFIBRILLATOR) PRESENT: ICD-10-CM

## 2018-11-30 DIAGNOSIS — I48.20 CHRONIC ATRIAL FIBRILLATION (HCC): Primary | ICD-10-CM

## 2018-11-30 DIAGNOSIS — I47.2 VENTRICULAR TACHYARRHYTHMIA (HCC): ICD-10-CM

## 2018-11-30 PROCEDURE — 93296 REM INTERROG EVL PM/IDS: CPT | Performed by: INTERNAL MEDICINE

## 2018-11-30 PROCEDURE — 93294 REM INTERROG EVL PM/LDLS PM: CPT | Performed by: INTERNAL MEDICINE

## 2018-11-30 NOTE — PROGRESS NOTES
Results for orders placed or performed in visit on 11/30/18   Cardiac EP device report    Narrative    MDT DUAL CHAMBER ICD  CARELINK TRANSMISSION: BATTERY VOLTAGE ADEQUATE (9 YRS)  -73% (>40% VVIR @ 55 PPM)  ALL AVAILABLE LEAD PARAMETERS WITHIN NORMAL LIMITS  PT IN CHRONIC AF & ON WARFARIN & METOPROLOL  OPTI-VOL WITHIN NORMAL LIMITS  NORMAL DEVICE FUNCTION   GV

## 2019-03-01 ENCOUNTER — REMOTE DEVICE CLINIC VISIT (OUTPATIENT)
Dept: CARDIOLOGY CLINIC | Facility: CLINIC | Age: 59
End: 2019-03-01
Payer: MEDICARE

## 2019-03-01 DIAGNOSIS — I48.19 PERSISTENT ATRIAL FIBRILLATION (HCC): ICD-10-CM

## 2019-03-01 DIAGNOSIS — Z95.810 PRESENCE OF AUTOMATIC CARDIOVERTER/DEFIBRILLATOR (AICD): ICD-10-CM

## 2019-03-01 DIAGNOSIS — I47.2 VENTRICULAR TACHYCARDIA (HCC): Primary | ICD-10-CM

## 2019-03-01 PROCEDURE — 93296 REM INTERROG EVL PM/IDS: CPT | Performed by: INTERNAL MEDICINE

## 2019-03-01 PROCEDURE — 93295 DEV INTERROG REMOTE 1/2/MLT: CPT | Performed by: INTERNAL MEDICINE

## 2019-03-26 ENCOUNTER — OFFICE VISIT (OUTPATIENT)
Dept: CARDIOLOGY CLINIC | Facility: CLINIC | Age: 59
End: 2019-03-26
Payer: MEDICARE

## 2019-03-26 VITALS
WEIGHT: 205 LBS | HEART RATE: 60 BPM | SYSTOLIC BLOOD PRESSURE: 100 MMHG | HEIGHT: 74 IN | DIASTOLIC BLOOD PRESSURE: 70 MMHG | BODY MASS INDEX: 26.31 KG/M2

## 2019-03-26 DIAGNOSIS — Z95.810 ICD (IMPLANTABLE CARDIOVERTER-DEFIBRILLATOR) IN PLACE: ICD-10-CM

## 2019-03-26 DIAGNOSIS — I47.2 VENTRICULAR TACHYCARDIA (HCC): ICD-10-CM

## 2019-03-26 DIAGNOSIS — I48.21 PERMANENT ATRIAL FIBRILLATION (HCC): ICD-10-CM

## 2019-03-26 DIAGNOSIS — E78.2 MIXED HYPERLIPIDEMIA: ICD-10-CM

## 2019-03-26 DIAGNOSIS — I42.6 ALCOHOLIC CARDIOMYOPATHY (HCC): Primary | ICD-10-CM

## 2019-03-26 PROCEDURE — 99213 OFFICE O/P EST LOW 20 MIN: CPT | Performed by: INTERNAL MEDICINE
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
## 2019-05-31 ENCOUNTER — REMOTE DEVICE CLINIC VISIT (OUTPATIENT)
Dept: CARDIOLOGY CLINIC | Facility: CLINIC | Age: 59
End: 2019-05-31
Payer: MEDICARE

## 2019-05-31 DIAGNOSIS — Z95.810 PRESENCE OF AUTOMATIC CARDIOVERTER/DEFIBRILLATOR (AICD): Primary | ICD-10-CM

## 2019-05-31 PROCEDURE — 93296 REM INTERROG EVL PM/IDS: CPT | Performed by: INTERNAL MEDICINE

## 2019-05-31 PROCEDURE — 93295 DEV INTERROG REMOTE 1/2/MLT: CPT | Performed by: INTERNAL MEDICINE

## 2019-08-02 ENCOUNTER — IN-CLINIC DEVICE VISIT (OUTPATIENT)
Dept: CARDIOLOGY CLINIC | Facility: CLINIC | Age: 59
End: 2019-08-02
Payer: MEDICARE

## 2019-08-02 DIAGNOSIS — I42.6 ALCOHOLIC CARDIOMYOPATHY (HCC): Primary | ICD-10-CM

## 2019-08-02 DIAGNOSIS — Z95.810 ICD (IMPLANTABLE CARDIOVERTER-DEFIBRILLATOR) IN PLACE: ICD-10-CM

## 2019-08-02 DIAGNOSIS — I48.21 PERMANENT ATRIAL FIBRILLATION (HCC): ICD-10-CM

## 2019-08-02 DIAGNOSIS — I47.2 VENTRICULAR TACHYARRHYTHMIA (HCC): ICD-10-CM

## 2019-08-02 PROCEDURE — 93282 PRGRMG EVAL IMPLANTABLE DFB: CPT | Performed by: INTERNAL MEDICINE

## 2019-08-02 NOTE — PROGRESS NOTES
Results for orders placed or performed in visit on 08/02/19   Cardiac EP device report    Narrative    MDT-DUAL CHAMBER ICD (VVIR MODE)  DEVICE INTERROGATED IN THE [place] OFFICE  BATTERY VOLTAGE ADEQUATE (6 8 YRS)   - 76% (>40%/VVIR 55)  ALL LEAD PARAMETERS WITHIN NORMAL LIMITS  ALL OTHER TESTING WITHIN NORMAL LIMITS  NO SIGNIFICANT HIGH RATE EPISODES  CHRONIC AF IN PROGRESS & PT TAKES WARFARIN, METOPROLOL TART  NO PROGRAMMING CHANGES MADE TO DEVICE PARAMETERS  APPROPRIATELY FUNCTIONING ICD      EB

## 2019-10-01 ENCOUNTER — OFFICE VISIT (OUTPATIENT)
Dept: CARDIOLOGY CLINIC | Facility: CLINIC | Age: 59
End: 2019-10-01
Payer: MEDICARE

## 2019-10-01 VITALS
HEIGHT: 74 IN | SYSTOLIC BLOOD PRESSURE: 94 MMHG | DIASTOLIC BLOOD PRESSURE: 60 MMHG | WEIGHT: 197 LBS | BODY MASS INDEX: 25.28 KG/M2 | HEART RATE: 60 BPM

## 2019-10-01 DIAGNOSIS — E78.2 MIXED HYPERLIPIDEMIA: ICD-10-CM

## 2019-10-01 DIAGNOSIS — I48.21 PERMANENT ATRIAL FIBRILLATION (HCC): ICD-10-CM

## 2019-10-01 DIAGNOSIS — Z95.810 ICD (IMPLANTABLE CARDIOVERTER-DEFIBRILLATOR) IN PLACE: ICD-10-CM

## 2019-10-01 DIAGNOSIS — I42.6 ALCOHOLIC CARDIOMYOPATHY (HCC): Primary | ICD-10-CM

## 2019-10-01 DIAGNOSIS — I47.2 VENTRICULAR TACHYCARDIA (HCC): ICD-10-CM

## 2019-10-01 PROCEDURE — 99213 OFFICE O/P EST LOW 20 MIN: CPT | Performed by: INTERNAL MEDICINE
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
## 2019-11-05 ENCOUNTER — REMOTE DEVICE CLINIC VISIT (OUTPATIENT)
Dept: CARDIOLOGY CLINIC | Facility: CLINIC | Age: 59
End: 2019-11-05
Payer: MEDICARE

## 2019-11-05 DIAGNOSIS — Z95.810 AICD (AUTOMATIC CARDIOVERTER/DEFIBRILLATOR) PRESENT: Primary | ICD-10-CM

## 2019-11-05 PROCEDURE — 93295 DEV INTERROG REMOTE 1/2/MLT: CPT | Performed by: INTERNAL MEDICINE

## 2019-11-05 PROCEDURE — 93296 REM INTERROG EVL PM/IDS: CPT | Performed by: INTERNAL MEDICINE

## 2019-11-05 NOTE — PROGRESS NOTES
Results for orders placed or performed in visit on 11/05/19   Cardiac EP device report    Narrative    MDT-DUAL CHAMBER ICD (VVIR MODE)  CARELINK TRANSMISSION: BATTERY VOLTAGE ADEQUATE (5 6 YRS)  -81% (>40% VVIR @ 55 PPM)  ALL AVAILABLE LEAD PARAMETERS WITHIN NORMAL LIMITS  PT IN CHRONIC AF & ON WARFARIN & METOPROLOL  OPTI-VOL WITHIN NORMAL LIMITS  NORMAL DEVICE FUNCTION   GV

## 2020-02-04 ENCOUNTER — REMOTE DEVICE CLINIC VISIT (OUTPATIENT)
Dept: CARDIOLOGY CLINIC | Facility: CLINIC | Age: 60
End: 2020-02-04
Payer: MEDICARE

## 2020-02-04 DIAGNOSIS — Z95.810 AICD (AUTOMATIC CARDIOVERTER/DEFIBRILLATOR) PRESENT: Primary | ICD-10-CM

## 2020-02-04 PROCEDURE — 93296 REM INTERROG EVL PM/IDS: CPT | Performed by: INTERNAL MEDICINE

## 2020-02-04 PROCEDURE — 93295 DEV INTERROG REMOTE 1/2/MLT: CPT | Performed by: INTERNAL MEDICINE

## 2020-02-04 NOTE — PROGRESS NOTES
Results for orders placed or performed in visit on 02/04/20   Cardiac EP device report    Narrative    MDT-DUAL CHAMBER ICD (VVIR MODE)/ ACTIVE SYSTEM IS MRI CONDITIONAL  CARELINK TRANSMISSION: BATTERY VOLTAGE ADEQUATE (4 9 YRS)  -78% (>40% VVIR @ 55 PPM)  ALL AVAILABLE LEAD PARAMETERS WITHIN NORMAL LIMITS  PT IN CHRONIC AF & ON WARFARIN & METOPROLOL  OPTI-VOL WITHIN NORMAL LIMITS  NORMAL DEVICE FUNCTION   GV

## 2020-05-05 ENCOUNTER — REMOTE DEVICE CLINIC VISIT (OUTPATIENT)
Dept: CARDIOLOGY CLINIC | Facility: CLINIC | Age: 60
End: 2020-05-05
Payer: MEDICARE

## 2020-05-05 DIAGNOSIS — Z95.810 PRESENCE OF AUTOMATIC CARDIOVERTER/DEFIBRILLATOR (AICD): Primary | ICD-10-CM

## 2020-05-05 PROCEDURE — 93295 DEV INTERROG REMOTE 1/2/MLT: CPT | Performed by: INTERNAL MEDICINE

## 2020-05-05 PROCEDURE — 93296 REM INTERROG EVL PM/IDS: CPT | Performed by: INTERNAL MEDICINE

## 2020-06-18 ENCOUNTER — TELEPHONE (OUTPATIENT)
Dept: CARDIOLOGY CLINIC | Facility: CLINIC | Age: 60
End: 2020-06-18

## 2020-06-30 ENCOUNTER — TELEPHONE (OUTPATIENT)
Dept: CARDIOLOGY CLINIC | Facility: CLINIC | Age: 60
End: 2020-06-30

## 2020-09-15 ENCOUNTER — TELEPHONE (OUTPATIENT)
Dept: CARDIOLOGY CLINIC | Facility: CLINIC | Age: 60
End: 2020-09-15

## 2020-09-15 NOTE — TELEPHONE ENCOUNTER
JavaScript must be enabled  Due to the current pandemic of COVID-19, patient was given the option to par take in a video/telephone call, which was accepted by the patient       Patient was informed via telephone,the following:     There is a possibility of a $27 00 fee to participate in this Virtual Visit  This is depending on your insurance company if they will cover that cost       Patients preferred phone number to contact is [phone]  Video option- Patient preferred e-mail: none

## 2020-09-21 ENCOUNTER — TELEPHONE (OUTPATIENT)
Dept: CARDIOLOGY CLINIC | Facility: CLINIC | Age: 60
End: 2020-09-21

## 2020-09-21 NOTE — TELEPHONE ENCOUNTER
Due to the current pandemic of COVID-19, patient was given the option to par take in a video/telephone call, which was accepted by the patient  Patient was informed via telephone,the following: There is a possibility of a $27 00 fee to participate in this Virtual Visit  This is depending on your insurance company if they will cover that cost      Patients preferred phone number to contact is [phone]    Video option- Patient preferred e-mail: none

## 2020-12-03 ENCOUNTER — REMOTE DEVICE CLINIC VISIT (OUTPATIENT)
Dept: CARDIOLOGY CLINIC | Facility: CLINIC | Age: 60
End: 2020-12-03
Payer: MEDICARE

## 2020-12-03 DIAGNOSIS — Z95.810 PRESENCE OF AUTOMATIC CARDIOVERTER/DEFIBRILLATOR (AICD): Primary | ICD-10-CM

## 2020-12-03 PROCEDURE — 93296 REM INTERROG EVL PM/IDS: CPT | Performed by: INTERNAL MEDICINE

## 2020-12-03 PROCEDURE — 93295 DEV INTERROG REMOTE 1/2/MLT: CPT | Performed by: INTERNAL MEDICINE

## 2020-12-04 ENCOUNTER — TELEPHONE (OUTPATIENT)
Dept: CARDIOLOGY CLINIC | Facility: CLINIC | Age: 60
End: 2020-12-04

## 2021-02-01 ENCOUNTER — TELEPHONE (OUTPATIENT)
Dept: CARDIOLOGY CLINIC | Facility: CLINIC | Age: 61
End: 2021-02-01

## 2021-03-01 ENCOUNTER — OFFICE VISIT (OUTPATIENT)
Dept: CARDIOLOGY CLINIC | Facility: CLINIC | Age: 61
End: 2021-03-01
Payer: MEDICARE

## 2021-03-01 VITALS
TEMPERATURE: 97.2 F | BODY MASS INDEX: 25.1 KG/M2 | DIASTOLIC BLOOD PRESSURE: 62 MMHG | HEIGHT: 74 IN | SYSTOLIC BLOOD PRESSURE: 110 MMHG | HEART RATE: 97 BPM

## 2021-03-01 DIAGNOSIS — I42.6 ALCOHOLIC CARDIOMYOPATHY (HCC): Primary | ICD-10-CM

## 2021-03-01 DIAGNOSIS — I47.2 VENTRICULAR TACHYCARDIA (HCC): ICD-10-CM

## 2021-03-01 DIAGNOSIS — I48.21 PERMANENT ATRIAL FIBRILLATION (HCC): ICD-10-CM

## 2021-03-01 DIAGNOSIS — E78.2 MIXED HYPERLIPIDEMIA: ICD-10-CM

## 2021-03-01 PROCEDURE — 99213 OFFICE O/P EST LOW 20 MIN: CPT | Performed by: INTERNAL MEDICINE

## 2021-03-01 NOTE — PROGRESS NOTES
Cardiology Follow Up    Adan Baird  1960  [id number]  [address]  [address]  [facility] [address] [phone]    Reason for visit:  Overdue for office visit for dilated cardiomyopathy with last measured ejection fraction 50% in 2017 which is nonischemic, ventricular tachycardia with ICD in situ, chronic atrial fibrillation, history of embolic stroke and hyperlipidemia  1  Alcoholic cardiomyopathy ([address] )     2  Permanent atrial fibrillation ([address] )     3  Ventricular tachycardia ([address] )     4  Mixed hyperlipidemia         Interval History:  Since the patient's last visit, he has done well  He denies chest pain, shortness of breath, palpitations, edema or lightheadedness    Patient Active Problem List   Diagnosis    Alcoholic cardiomyopathy ([address] )    Permanent atrial fibrillation ([address] )    ICD (implantable cardioverter-defibrillator) in place    Ventricular tachycardia [facility] )    Mixed hyperlipidemia     Past Medical History:   Diagnosis Date    Atrial fibrillation ([facility] )     Bipolar affective disorder ([address] )     Cardiomyopathy ([address] )     Depression     Diabetes mellitus ([address] )     Dysphagia     Hyperlipidemia     Hypertension     ICD (implantable cardioverter-defibrillator) in place     Pneumothorax     Stroke ([facility] )     Upper GI bleed     Urinary incontinence      Social History     Socioeconomic History    Marital status:       Spouse name: Not on file    Number of children: Not on file    Years of education: Not on file    Highest education level: Not on file   Occupational History    Not on file   Social Needs    Financial resource strain: Not on file    Food insecurity     Worry: Not on file     Inability: Not on file    Transportation needs     Medical: Not on file     Non-medical: Not on file   Tobacco Use    Smoking status: Former Smoker    Smokeless tobacco: Never Used   Substance and Sexual Activity    Alcohol use: No     Comment: former abuse    Drug use: No    Sexual activity: Not on file   Lifestyle    Physical activity     Days per week: Not on file     Minutes per session: Not on file    Stress: Not on file   Relationships    Social connections     Talks on phone: Not on file     Gets together: Not on file     Attends Faith service: Not on file     Active member of club or organization: Not on file     Attends meetings of clubs or organizations: Not on file     Relationship status: Not on file    Intimate partner violence     Fear of current or ex partner: Not on file     Emotionally abused: Not on file     Physically abused: Not on file     Forced sexual activity: Not on file   Other Topics Concern    Not on file   Social History Narrative    Not on file      Family History   Problem Relation Age of Onset    Coronary artery disease Family      Past Surgical History:   Procedure Laterality Date    CARDIAC DEFIBRILLATOR PLACEMENT      IVC FILTER INSERTION      KNEE SURGERY      UMBILICAL HERNIA REPAIR         Current Outpatient Medications:     acetaminophen (TYLENOL) 325 mg tablet, Take 2 tablets by mouth every 6 (six) hours as needed for mild pain, Disp: 30 tablet, Rfl: 0    atorvastatin (LIPITOR) 10 mg tablet, 0 5 tablets (5 mg total) by Per G Tube route daily, Disp: , Rfl:     gabapentin (NEURONTIN) 250 mg/5 mL solution, 6 mL by Per G Tube route daily, Disp: , Rfl:     magnesium hydroxide (MILK OF MAGNESIA CONCENTRATE) 2400 mg/10 mL SUSP concentrated oral suspension, Take by mouth, Disp: , Rfl:     metFORMIN (FORTAMET) 500 MG (OSM) 24 hr tablet, Take 250 mg by mouth 2 (two) times a day Give per G tube, Disp: , Rfl:     metoprolol tartrate (LOPRESSOR) 25 mg tablet, 25 mg by Per G Tube route every 12 (twelve) hours, Disp: , Rfl:     omeprazole (PriLOSEC) 20 mg delayed release capsule, Take 20 mg by mouth daily, Disp: , Rfl:     warfarin (COUMADIN) 2 5 mg tablet, 2 5 mg by Per G Tube route daily Give with 4mg = total 6 5, Disp: , Rfl:     warfarin (COUMADIN) 4 mg tablet, 4 mg by Per G Tube route daily Give with 2 5 mg dose = total 6 5mg, Disp: , Rfl:     citalopram (CeleXA) 10 mg tablet, 10 mg by Per G Tube route, Disp: , Rfl:   No Known Allergies    Review of Systems:  Review of Systems   Constitutional: Negative for fatigue and unexpected weight change  Respiratory: Negative for cough, shortness of breath and wheezing  Cardiovascular: Negative for chest pain, palpitations and leg swelling  Gastrointestinal: Negative for blood in stool, constipation and diarrhea  Genitourinary: Negative for frequency and hematuria  Musculoskeletal: Negative for arthralgias  Physical Exam:  Vitals:    03/01/21 1329   BP: 110/62   BP Location: Right arm   Patient Position: Sitting   Pulse: 97   Temp: (!) 97 2 °F (36 2 °C)   Height: 6' 2" (1 88 m)       Physical Exam  Constitutional:       Appearance: Normal appearance  He is normal weight  He is not ill-appearing  Comments: Aphasic    HENT:      Head: Normocephalic and atraumatic  Mouth/Throat:      Mouth: Mucous membranes are moist       Pharynx: No oropharyngeal exudate or posterior oropharyngeal erythema  Eyes:      General: No scleral icterus  Conjunctiva/sclera: Conjunctivae normal    Neck:      Musculoskeletal: Neck supple  Thyroid: No thyroid mass  Vascular: Normal carotid pulses  No carotid bruit or JVD  Cardiovascular:      Rate and Rhythm: Normal rate  Rhythm irregular  Pulses:           Dorsalis pedis pulses are 1+ on the right side and 1+ on the left side  Posterior tibial pulses are 1+ on the right side and 1+ on the left side  Heart sounds: No murmur  No friction rub  No gallop  Pulmonary:      Effort: No bradypnea or respiratory distress  Breath sounds: No stridor  No decreased breath sounds, wheezing, rhonchi or rales     Abdominal:      Palpations: Abdomen is soft  There is no hepatomegaly, splenomegaly, mass or pulsatile mass  Tenderness: There is no abdominal tenderness  Musculoskeletal:         General: No swelling  Right lower leg: No edema  Left lower leg: No edema  Lymphadenopathy:      Cervical:      Left cervical: No posterior cervical adenopathy  Skin:     Findings: No lesion  Neurological:      Motor: No weakness  Discussion/Summary:  1  Dilated cardiomyopathy  Last measured ejection fraction 50% in 2017  Patient on---metoprolol tartrate 25 mg b i d   No need for afterload reduction with improvement in LV function  Likely related to alcohol abstinence  2  Permanent AFib  Rate well controlled on metoprolol at a low dose  On warfarin for stroke prophylaxis  3  Ventricular tachycardia  Has not been on amiodarone for some time  Some nonsustained ventricular tachycardia  Will watch going forward  Might enhance beta-blocker dosage if we see more of this  4  Mixed hyperlipidemia  Patient on atorvastatin 10 mg daily    LDL cholesterol 25 with HDL cholesterol 33 triglycerides 39 in October      Follow-up 9 months    Garth Simmonds, MD

## 2021-03-04 ENCOUNTER — IN-CLINIC DEVICE VISIT (OUTPATIENT)
Dept: CARDIOLOGY CLINIC | Facility: CLINIC | Age: 61
End: 2021-03-04
Payer: MEDICARE

## 2021-03-04 DIAGNOSIS — Z95.810 PRESENCE OF AUTOMATIC CARDIOVERTER/DEFIBRILLATOR (AICD): Primary | ICD-10-CM

## 2021-03-04 PROCEDURE — 93283 PRGRMG EVAL IMPLANTABLE DFB: CPT | Performed by: INTERNAL MEDICINE

## 2021-03-04 NOTE — PROGRESS NOTES
Results for orders placed or performed in visit on 03/04/21   Cardiac EP device report    Narrative    MDT-DUAL CHAMBER ICD (VVIR MODE)/ ACTIVE SYSTEM IS MRI CONDITIONAL  DEVICE INTERROGATED IN THE [place] OFFICE  BATTERY VOLTAGE ADEQUATE (2 2 YRS)  AP: 0%  : 77 9% (>40%~AF/VVIR/55)  ALL AVAILABLE LEAD PARAMETERS WITHIN NORMAL LIMITS  1 VT MONITORED EPISODE W/ EGRM SHOWING BRIEF EPISODE OF RVR > 200 BPM   1 AT/AF EPISODE W/ AF IN PROGRESS (HX OF SAME)  AF BURDEN: 100%  PT TAKES WARFARIN, METOPROLOL TART  EF: 50-55% (ECHO 08/03/17)  OPTI-VOL WITHIN NORMAL LIMITS  NO PROGRAMMING CHANGES MADE TO DEVICE PARAMETERS  APPROPRIATELY FUNCTIONING ICD    [address] [place] [address] Street

## 2021-06-14 ENCOUNTER — REMOTE DEVICE CLINIC VISIT (OUTPATIENT)
Dept: CARDIOLOGY CLINIC | Facility: CLINIC | Age: 61
End: 2021-06-14
Payer: MEDICARE

## 2021-06-14 DIAGNOSIS — Z95.810 ICD (IMPLANTABLE CARDIOVERTER-DEFIBRILLATOR) IN PLACE: Primary | ICD-10-CM

## 2021-06-14 PROCEDURE — 93296 REM INTERROG EVL PM/IDS: CPT | Performed by: INTERNAL MEDICINE

## 2021-06-14 PROCEDURE — 93295 DEV INTERROG REMOTE 1/2/MLT: CPT | Performed by: INTERNAL MEDICINE

## 2021-06-14 NOTE — PROGRESS NOTES
Results for orders placed or performed in visit on 06/14/21   Cardiac EP device report    Narrative    MDT-DUAL CHAMBER ICD (VVIR MODE)/ ACTIVE SYSTEM IS MRI CONDITIONAL  CARELINK TRANSMISSION: BATTERY VOLTAGE ADEQUATE (2 YRS)   77% (>40%/VVIR 55)  ALL AVAILABLE LEAD PARAMETERS WITHIN NORMAL LIMITS  1 HIGH RATE NON-SUST  EPISODE, 8 BEATS @  BPM W/EGRM FOR RVR VS NSVT  CHRONIC AF (100% BURDEN)  EF 50-55% (8/2017 ECHO)  PT TAKES WARFARIN, METOPROLOL TART  OPTI-VOL WITHIN NORMAL LIMITS  NORMAL DEVICE FUNCTION     ES

## 2021-12-13 ENCOUNTER — REMOTE DEVICE CLINIC VISIT (OUTPATIENT)
Dept: CARDIOLOGY CLINIC | Facility: CLINIC | Age: 61
End: 2021-12-13
Payer: MEDICARE

## 2021-12-13 DIAGNOSIS — Z95.810 PRESENCE OF AUTOMATIC CARDIOVERTER/DEFIBRILLATOR (AICD): Primary | ICD-10-CM

## 2021-12-13 PROCEDURE — 93296 REM INTERROG EVL PM/IDS: CPT | Performed by: INTERNAL MEDICINE

## 2021-12-13 PROCEDURE — 93295 DEV INTERROG REMOTE 1/2/MLT: CPT | Performed by: INTERNAL MEDICINE

## 2022-01-09 PROBLEM — I63.419 CEREBROVASCULAR ACCIDENT (CVA) DUE TO EMBOLISM OF MIDDLE CEREBRAL ARTERY (HCC): Status: ACTIVE | Noted: 2022-01-09

## 2022-01-09 PROBLEM — D69.6 THROMBOCYTOPENIA (HCC): Status: ACTIVE | Noted: 2022-01-09

## 2022-01-10 ENCOUNTER — OFFICE VISIT (OUTPATIENT)
Dept: CARDIOLOGY CLINIC | Facility: CLINIC | Age: 62
End: 2022-01-10
Payer: MEDICARE

## 2022-01-10 VITALS
SYSTOLIC BLOOD PRESSURE: 96 MMHG | BODY MASS INDEX: 25.67 KG/M2 | HEART RATE: 60 BPM | WEIGHT: 200 LBS | DIASTOLIC BLOOD PRESSURE: 60 MMHG | HEIGHT: 74 IN

## 2022-01-10 DIAGNOSIS — E78.2 MIXED HYPERLIPIDEMIA: ICD-10-CM

## 2022-01-10 DIAGNOSIS — I48.21 PERMANENT ATRIAL FIBRILLATION (HCC): ICD-10-CM

## 2022-01-10 DIAGNOSIS — I42.6 ALCOHOLIC CARDIOMYOPATHY (HCC): Primary | ICD-10-CM

## 2022-01-10 DIAGNOSIS — D69.6 THROMBOCYTOPENIA (HCC): ICD-10-CM

## 2022-01-10 DIAGNOSIS — I47.2 VENTRICULAR TACHYCARDIA (HCC): ICD-10-CM

## 2022-01-10 PROCEDURE — 99213 OFFICE O/P EST LOW 20 MIN: CPT | Performed by: INTERNAL MEDICINE

## 2022-01-10 NOTE — PROGRESS NOTES
Cardiology Follow Up    Julia Chowdhury  1960  [id number]  [address] [facility] [address]  [phone]    Reason for visit:   9 month FU for dilated cardiomyopathy with last measured ejection fraction 50% in 2017 which is nonischemic (prior ETOH abuse prior to CVA), ventricular tachycardia with ICD in situ, chronic atrial fibrillation, history of embolic stroke and hyperlipidemia  1  Alcoholic cardiomyopathy ([address] )     2  Permanent atrial fibrillation ([address] )     3  Ventricular tachycardia ([address] )     4  Mixed hyperlipidemia     5  Thrombocytopenia ([address] )         Interval History:  Since the patient's last visit, he denies chest pain, shortness of breath, palpitations, edema or lightheadedness  He is ambulatory  Patient Active Problem List   Diagnosis    Alcoholic cardiomyopathy ([address] )    Permanent atrial fibrillation ([address] )    ICD (implantable cardioverter-defibrillator) in place    Ventricular tachycardia ([address] )    Mixed hyperlipidemia    Thrombocytopenia ([address] )    Cerebrovascular accident (CVA) due to embolism of middle cerebral artery ([address] )     Past Medical History:   Diagnosis Date    Atrial fibrillation ([address] )     Bipolar affective disorder ([address] )     Cardiomyopathy ([address] )     Depression     Diabetes mellitus ([address] )     Dysphagia     Hyperlipidemia     Hypertension     ICD (implantable cardioverter-defibrillator) in place     Pneumothorax     Stroke ([address] )     Upper GI bleed     Urinary incontinence      Social History     Socioeconomic History    Marital status:       Spouse name: Not on file    Number of children: Not on file    Years of education: Not on file    Highest education level: Not on file   Occupational History    Not on file   Tobacco Use    Smoking status: Former Smoker    Smokeless tobacco: Never Used   Substance and Sexual Activity    Alcohol use: No     Comment: former abuse    Drug use: No    Sexual activity: Not on file   Other Topics Concern    Not on file   Social History Narrative    Not on file     Social Determinants of Health     Financial Resource Strain: Not on file   Food Insecurity: Not on file   Transportation Needs: Not on file   Physical Activity: Not on file   Stress: Not on file   Social Connections: Not on file   Intimate Partner Violence: Not on file   Housing Stability: Not on file      Family History   Problem Relation Age of Onset    Coronary artery disease Family      Past Surgical History:   Procedure Laterality Date    CARDIAC DEFIBRILLATOR PLACEMENT      IVC FILTER INSERTION      KNEE SURGERY      UMBILICAL HERNIA REPAIR         Current Outpatient Medications:     acetaminophen (TYLENOL) 325 mg tablet, Take 2 tablets by mouth every 6 (six) hours as needed for mild pain, Disp: 30 tablet, Rfl: 0    atorvastatin (LIPITOR) 10 mg tablet, 0 5 tablets (5 mg total) by Per G Tube route daily, Disp: , Rfl:     gabapentin (NEURONTIN) 250 mg/5 mL solution, 6 mL by Per G Tube route daily, Disp: , Rfl:     magnesium hydroxide (MILK OF MAGNESIA CONCENTRATE) 2400 mg/10 mL SUSP concentrated oral suspension, Take by mouth, Disp: , Rfl:     metFORMIN (FORTAMET) 500 MG (OSM) 24 hr tablet, Take 250 mg by mouth 2 (two) times a day Give per G tube, Disp: , Rfl:     metoprolol tartrate (LOPRESSOR) 25 mg tablet, 25 mg by Per G Tube route every 12 (twelve) hours, Disp: , Rfl:     omeprazole (PriLOSEC) 20 mg delayed release capsule, Take 20 mg by mouth daily, Disp: , Rfl:     warfarin (COUMADIN) 2 5 mg tablet, 2 5 mg by Per G Tube route daily Give with 4mg = total 6 5, Disp: , Rfl:     warfarin (COUMADIN) 4 mg tablet, 4 mg by Per G Tube route daily Give with 2 5 mg dose = total 6 5mg, Disp: , Rfl:     citalopram (CeleXA) 10 mg tablet, 10 mg by Per G Tube route (Patient not taking: Reported on 1/10/2022 ), Disp: , Rfl:   No Known Allergies    Result Date: 12/13/2021  Narrative: MDT-DUAL CHAMBER ICD (VVIR MODE)/ ACTIVE SYSTEM IS MRI CONDITIONAL CARELINK TRANSMISSION: BATTERY VOLTAGE ADEQUATE  (1 8 YRS)  70%  ALL AVAILABLE LEAD PARAMETERS WITHIN NORMAL LIMITS  1 NS-HIGH RATE EPISODE DETECTED  100% AF  PATIENT IS ON METOPROLOL AND WARFARIN  OPTI-VOL WITHIN NORMAL LIMITS  NORMAL DEVICE FUNCTION  ----MORIN       Review of Systems:  Review of Systems   Constitutional: Negative for activity change, appetite change, fatigue and unexpected weight change  Respiratory: Negative for cough, chest tightness, shortness of breath and wheezing  Cardiovascular: Negative for chest pain, palpitations and leg swelling  Gastrointestinal: Negative for abdominal pain, blood in stool, constipation and diarrhea  Genitourinary: Negative for frequency and hematuria  Musculoskeletal: Negative for arthralgias and back pain  Neurological: Negative for dizziness and light-headedness  Physical Exam:  Vitals:    01/10/22 1259   BP: 96/60   Pulse: 60   Weight: 90 7 kg (200 lb)   Height: 6' 2" (1 88 m)       Physical Exam  Constitutional:       Comments: Aphasia    healthy appearing   HENT:      Head: Normocephalic and atraumatic  Mouth/Throat:      Mouth: Mucous membranes are moist       Pharynx: No oropharyngeal exudate or posterior oropharyngeal erythema  Eyes:      General: No scleral icterus  Conjunctiva/sclera: Conjunctivae normal    Neck:      Thyroid: No thyroid mass or thyromegaly  Vascular: Normal carotid pulses  No carotid bruit or JVD  Cardiovascular:      Rate and Rhythm: Normal rate and regular rhythm  Pulses:           Dorsalis pedis pulses are 1+ on the right side and 1+ on the left side  Posterior tibial pulses are 1+ on the right side and 1+ on the left side  Heart sounds: No murmur heard  No friction rub  No gallop  Pulmonary:      Breath sounds: Decreased breath sounds present  No wheezing, rhonchi or rales     Abdominal: Palpations: Abdomen is soft  There is no hepatomegaly, splenomegaly or mass  Tenderness: There is no abdominal tenderness  Musculoskeletal:         General: Swelling (trace post tibial bilaterally) present  Cervical back: Neck supple  Discussion/Summary:  1  Dilated cardiomyopathy due to prior alcohol abuse  Last measured ejection fraction 50% in 2017  Patient on metoprolol 25 mg b i d  Hard to advance dosage since blood pressure low normal   No indication for afterload reduction in light of current ejection fraction and low normal blood pressure  2  Atrial fibrillation  Chronic  Prior embolic CVA when patient was not compliant with warfarin  Patient on warfarin now controlled by facility     On metoprolol for rate control  Rate adequately controlled on low-dose metoprolol  3  Ventricular tachycardia status post ICD  Interrogations have shown some nonsustained ventricular tachycardia which is fairly short  At 1 time he was on amiodarone but has been off this for some time and has not had much in the way of ventricular tachycardia  Will try to leave him off of this for this reason especially since he is on concurrent Coumadin therapy  4  Hyperlipidemia  Patient on atorvastatin 10 mg daily  Most recent LDL cholesterol 30 with HDL cholesterol 38 and triglycerides of 40   5  Thrombocytopenia  Mild  Recent platelet count a [id number]  Can continue warfarin safely with this mild degree of thrombocytopenia    Has had no evidence for bleeding from warfarin    FU 9 months      Anju Freitas MD

## 2022-04-22 ENCOUNTER — IN-CLINIC DEVICE VISIT (OUTPATIENT)
Dept: CARDIOLOGY CLINIC | Facility: CLINIC | Age: 62
End: 2022-04-22
Payer: MEDICARE

## 2022-04-22 DIAGNOSIS — Z95.0 PRESENCE OF CARDIAC PACEMAKER: Primary | ICD-10-CM

## 2022-04-22 PROCEDURE — 93282 PRGRMG EVAL IMPLANTABLE DFB: CPT | Performed by: INTERNAL MEDICINE

## 2022-04-22 NOTE — PROGRESS NOTES
Results for orders placed or performed in visit on 04/22/22   Cardiac EP device report    Narrative    MDT-DUAL CHAMBER ICD (VVIR MODE)/ ACTIVE SYSTEM IS MRI CONDITIONAL  DEVICE INTERROGATED IN THE [place] OFFICE  BATTERY VOLTAGE ADEQUATE (20 MOS)  AP: 0%  : 70 4% (>40%~VVIR/55~MVP-OFF)  ALL LEAD PARAMETERS WITHIN NORMAL LIMITS  1 AT/AF EPISODE W/ AF IN PROGRESS (HX OF SAME) AF BURDEN: 100%  VT EPISODES PREVIOUSLY ADDRESSED  PT TAKES METOPROLOL TART, WARFARIN  EF: 50-55% (ECHO 8/3/17)  OPTI-VOL WITHIN NORMAL LIMITS  NO PROGRAMMING CHANGES MADE TO DEVICE PARAMETERS  APPROPRIATELY FUNCTIONING ICD    [address] [place] [address]

## 2022-08-26 ENCOUNTER — REMOTE DEVICE CLINIC VISIT (OUTPATIENT)
Dept: CARDIOLOGY CLINIC | Facility: CLINIC | Age: 62
End: 2022-08-26
Payer: MEDICARE

## 2022-08-26 DIAGNOSIS — Z95.810 AICD (AUTOMATIC CARDIOVERTER/DEFIBRILLATOR) PRESENT: Primary | ICD-10-CM

## 2022-08-26 PROCEDURE — 93295 DEV INTERROG REMOTE 1/2/MLT: CPT | Performed by: INTERNAL MEDICINE

## 2022-08-26 PROCEDURE — 93296 REM INTERROG EVL PM/IDS: CPT | Performed by: INTERNAL MEDICINE

## 2022-08-26 NOTE — PROGRESS NOTES
Results for orders placed or performed in visit on 08/26/22   Cardiac EP device report    Narrative    MDT-DUAL CHAMBER ICD (VVIR MODE)/ ACTIVE SYSTEM IS MRI CONDITIONAL  CARELINK TRANSMISSION: BATTERY STATUS "1 6 YRS "  73%  ALL AVAILABLE LEAD PARAMETERS WITHIN NORMAL LIMITS  NO SIGNIFICANT HIGH RATE EPISODES  OPTI-VOL WITHIN NORMAL LIMITS  NORMAL DEVICE FUNCTION   NC

## 2022-09-29 NOTE — PROGRESS NOTES
Cardiology Consultation     Guero Segura  [id number]  1960  [address] [facility] [address] [facility] [address]      1  Alcoholic cardiomyopathy ([facility] )  Echo complete w/ contrast if indicated   2  Atherosclerosis of arteries of extremities (HCC)     3  Permanent atrial fibrillation ([address] )     4  ICD (implantable cardioverter-defibrillator) in place     5  Mixed hyperlipidemia         Discussion/Summary:  Nonischemic dilated cardiomyopathy   -presumed to be alcoholic cardiomyopathy  -prior alcohol abuse before CVA  -TTE 08/03/2017 EF 50-55%, moderate LA, mild to moderate RA, mild-to-moderate MR, mild TR  -repeat TTE  Ventricular tachycardia   -s/p Medtronic dual chamber ICD  -previously on amiodarone, however discontinued years ago and has not had significant VT  Permanent atrial fibrillation  -on Lopressor 25 mg b i d   -anticoagulated on warfarin  -rate well controlled  Hyperlipidemia   -on atorvastatin 10 mg daily  -cholesterol well controlled current regiment  Prior CVA to [facility]  -in setting of atrial fibrillation and patient noncompliant with warfarin  -residual left-sided weakness  Thrombocytopenia   -platelet count 150 on 12/22/2021  -counts appear to be stable in the low 100s  Type 2 diabetes   -hemoglobin A1c 6 1 on 12/22/2021    History of Present Illness:  Guero Segura is a 60y o  year old male with a past medical history of alcoholic dilated cardiomyopathy, permanent atrial fibrillation, VT s/p ICD, hyperlipidemia, type 2 diabetes, thrombocytopenia and prior CVA to [facility]  Patient is nonverbal, but was able to nod yes or no to questions  He is accompanied by when the aides who drove him from his facility  The patient and his aide have no complaints  He acknowledges feeling well and denied any chest pain, shortness of breath, lower extremity edema or other cardiac symptoms      Patient Active Problem List   Diagnosis    Alcoholic cardiomyopathy [facility])    Permanent atrial fibrillation ([facility] )    ICD (implantable cardioverter-defibrillator) in place    Ventricular tachycardia ([place] [address] )    Mixed hyperlipidemia    Thrombocytopenia ([place] [address] )    Cerebrovascular accident (CVA) due to embolism of middle cerebral artery ([facility] )    Atherosclerosis of arteries of extremities ([facility] )     Past Medical History:   Diagnosis Date    Atrial fibrillation ([place] [address] )     Bipolar affective disorder ([place] [address] )     Cardiomyopathy ([place] [address] )     Depression     Diabetes mellitus ([place] [address] )     Dysphagia     Hyperlipidemia     Hypertension     ICD (implantable cardioverter-defibrillator) in place     Pneumothorax     Stroke ([place] [address] )     Upper GI bleed     Urinary incontinence      Social History     Socioeconomic History    Marital status:       Spouse name: Not on file    Number of children: Not on file    Years of education: Not on file    Highest education level: Not on file   Occupational History    Not on file   Tobacco Use    Smoking status: Former Smoker    Smokeless tobacco: Never Used   Substance and Sexual Activity    Alcohol use: No     Comment: former abuse    Drug use: No    Sexual activity: Not on file   Other Topics Concern    Not on file   Social History Narrative    Not on file     Social Determinants of Health     Financial Resource Strain: Not on file   Food Insecurity: Not on file   Transportation Needs: Not on file   Physical Activity: Not on file   Stress: Not on file   Social Connections: Not on file   Intimate Partner Violence: Not on file   Housing Stability: Not on file      Family History   Problem Relation Age of Onset    Coronary artery disease Family      Past Surgical History:   Procedure Laterality Date    CARDIAC DEFIBRILLATOR PLACEMENT      IVC FILTER INSERTION      KNEE SURGERY      UMBILICAL HERNIA REPAIR         Current Outpatient Medications:     acetaminophen (TYLENOL) 325 mg tablet, Take 2 tablets by mouth every 6 (six) hours as needed for mild pain, Disp: 30 tablet, Rfl: 0    atorvastatin (LIPITOR) 10 mg tablet, 0 5 tablets (5 mg total) by Per G Tube route daily, Disp: , Rfl:     gabapentin (NEURONTIN) 250 mg/5 mL solution, 6 mL by Per G Tube route daily, Disp: , Rfl:     metFORMIN (FORTAMET) 500 MG (OSM) 24 hr tablet, Take 250 mg by mouth 2 (two) times a day Give per G tube, Disp: , Rfl:     metoprolol tartrate (LOPRESSOR) 25 mg tablet, 25 mg by Per G Tube route every 12 (twelve) hours, Disp: , Rfl:     omeprazole (PriLOSEC) 20 mg delayed release capsule, Take 20 mg by mouth daily, Disp: , Rfl:     warfarin (COUMADIN) 2 5 mg tablet, 2 5 mg by Per G Tube route daily Give with 4mg = total 6 5, Disp: , Rfl:     warfarin (COUMADIN) 4 mg tablet, 4 mg by Per G Tube route daily Give with 2 5 mg dose = total 6 5mg, Disp: , Rfl:     citalopram (CeleXA) 10 mg tablet, 10 mg by Per G Tube route (Patient not taking: No sig reported), Disp: , Rfl:     magnesium hydroxide (MILK OF MAGNESIA) 2400 mg/10 mL SUSP concentrated oral suspension, Take by mouth (Patient not taking: Reported on 9/30/2022), Disp: , Rfl:   No Known Allergies      Labs:  Lab Results   Component Value Date    ALT 33 01/27/2014    AST 28 07/21/2014    BUN 9 07/21/2014    CALCIUM 9 5 07/21/2014     07/21/2014    CHOL 135 07/21/2014    CO2 33 (H) 08/14/2017    CREATININE 0 99 07/21/2014    HDL 44 07/21/2014    HCT 45 08/14/2017    HGB 15 3 08/14/2017    HGBA1C 6 1 (H) 12/22/2021     (L) 08/14/2017    K 4 2 07/21/2014     07/21/2014    TRIG 214 07/21/2014    WBC 4 81 08/14/2017       Imaging: No results found  Review of Systems:  Review of Systems   Unable to perform ROS: Patient nonverbal     EP device report 08/26/2022 Medtronic dual chamber ICD   CARELINK TRANSMISSION: BATTERY STATUS "1 6 YRS "  73%  ALL AVAILABLE LEAD PARAMETERS WITHIN NORMAL LIMITS  NO SIGNIFICANT HIGH RATE EPISODES  OPTI-VOL WITHIN NORMAL LIMITS  NORMAL DEVICE FUNCTION   NC    Vitals: 09/30/22 0928   BP: 95/66   Pulse: (!) 54      Vitals:    09/30/22 0928   Weight: 90 7 kg (200 lb)     Height: 6' 2" (188 cm)     Physical Exam:  General appearance:  Appears stated age, alert, well appearing and in no distress  HEENT:  PERRLA, EOMI, no scleral icterus, no conjunctival pallor  NECK:  Supple, No elevated JVP, no thyromegaly, no carotid bruits  HEART:  Regular rate and rhythm, normal S1/S2, no S3/S4, no murmur or rub  LUNGS:  Clear to auscultation bilaterally  ABDOMEN:  Soft, non-tender, positive bowel sounds, no rebound or guarding, no organomegaly   EXTREMITIES:  No edema  VASCULAR:  Normal pedal pulses   SKIN: No lesions or rashes on exposed skin  NEURO:  CN II-XII intact, left upper extremity paralysis, left lower extremity weakness from prior CVA

## 2022-09-30 ENCOUNTER — OFFICE VISIT (OUTPATIENT)
Dept: CARDIOLOGY CLINIC | Facility: CLINIC | Age: 62
End: 2022-09-30
Payer: MEDICARE

## 2022-09-30 VITALS
BODY MASS INDEX: 25.67 KG/M2 | DIASTOLIC BLOOD PRESSURE: 66 MMHG | HEIGHT: 74 IN | HEART RATE: 54 BPM | WEIGHT: 200 LBS | SYSTOLIC BLOOD PRESSURE: 95 MMHG

## 2022-09-30 DIAGNOSIS — E78.2 MIXED HYPERLIPIDEMIA: ICD-10-CM

## 2022-09-30 DIAGNOSIS — I70.209 ATHEROSCLEROSIS OF ARTERIES OF EXTREMITIES (HCC): ICD-10-CM

## 2022-09-30 DIAGNOSIS — I48.21 PERMANENT ATRIAL FIBRILLATION (HCC): ICD-10-CM

## 2022-09-30 DIAGNOSIS — I42.6 ALCOHOLIC CARDIOMYOPATHY (HCC): Primary | ICD-10-CM

## 2022-09-30 DIAGNOSIS — Z95.810 ICD (IMPLANTABLE CARDIOVERTER-DEFIBRILLATOR) IN PLACE: ICD-10-CM

## 2022-09-30 PROCEDURE — 99214 OFFICE O/P EST MOD 30 MIN: CPT | Performed by: STUDENT IN AN ORGANIZED HEALTH CARE EDUCATION/TRAINING PROGRAM

## 2022-11-02 ENCOUNTER — REMOTE DEVICE CLINIC VISIT (OUTPATIENT)
Dept: CARDIOLOGY CLINIC | Facility: CLINIC | Age: 62
End: 2022-11-02

## 2022-11-02 DIAGNOSIS — Z95.810 AICD (AUTOMATIC CARDIOVERTER/DEFIBRILLATOR) PRESENT: Primary | ICD-10-CM

## 2022-11-02 NOTE — PROGRESS NOTES
Results for orders placed or performed in visit on 11/02/22   Cardiac EP device report    Narrative    MDT-DUAL CHAMBER ICD (VVIR MODE)/ ACTIVE SYSTEM IS MRI CONDITIONAL  CARELINK TRANSMISSION: BATTERY VOLTAGE ADEQUATE (17 MOS)  -72% (>40% [email or web address])  ALL AVAILABLE LEAD PARAMETERS WITHIN NORMAL LIMITS  NO NEW SIGNIFICANT HIGH RATE EPISODES  PT IN CHRONIC AF & ON WARFARIN & METOPROLOL  OPTI-VOL WITHIN NORMAL LIMITS  NORMAL DEVICE FUNCTION   GV

## 2022-11-22 ENCOUNTER — HOSPITAL ENCOUNTER (OUTPATIENT)
Dept: NON INVASIVE DIAGNOSTICS | Facility: HOSPITAL | Age: 62
Discharge: HOME | End: 2022-11-22
Attending: STUDENT IN AN ORGANIZED HEALTH CARE EDUCATION/TRAINING PROGRAM

## 2022-11-22 VITALS
WEIGHT: 200 LBS | BODY MASS INDEX: 25.67 KG/M2 | HEIGHT: 74 IN | SYSTOLIC BLOOD PRESSURE: 95 MMHG | HEART RATE: 54 BPM | DIASTOLIC BLOOD PRESSURE: 66 MMHG

## 2022-11-22 DIAGNOSIS — I42.6 ALCOHOLIC CARDIOMYOPATHY (HCC): ICD-10-CM

## 2022-11-22 LAB
AORTIC ROOT: 3 CM
APICAL FOUR CHAMBER EJECTION FRACTION: 54 %
ASCENDING AORTA: 3.2 CM
FRACTIONAL SHORTENING: 26 % (ref 28–44)
INTERVENTRICULAR SEPTUM IN DIASTOLE (PARASTERNAL SHORT AXIS VIEW): 1.6 CM
INTERVENTRICULAR SEPTUM: 1.6 CM (ref 0.6–1.1)
LAAS-AP2: 30.5 CM2
LAAS-AP4: 31 CM2
LEFT ATRIUM SIZE: 5.2 CM
LEFT INTERNAL DIMENSION IN SYSTOLE: 3.7 CM (ref 2.1–4)
LEFT VENTRICLE DIASTOLIC VOLUME (MOD BIPLANE): 77 ML
LEFT VENTRICLE SYSTOLIC VOLUME (MOD BIPLANE): 39 ML
LEFT VENTRICULAR INTERNAL DIMENSION IN DIASTOLE: 5 CM (ref 3.5–6)
LEFT VENTRICULAR POSTERIOR WALL IN END DIASTOLE: 1.3 CM
LEFT VENTRICULAR STROKE VOLUME: 59 ML
LV EF: 50 %
LVSV (TEICH): 59 ML
RIGHT ATRIAL 2D VOLUME: 72 ML
RIGHT ATRIUM AREA SYSTOLE A4C: 24.5 CM2
RIGHT VENTRICLE ID DIMENSION: 4.5 CM
SL CV LEFT ATRIUM LENGTH A2C: 7.1 CM
SL CV LV EF: 40
SL CV PED ECHO LEFT VENTRICLE DIASTOLIC VOLUME (MOD BIPLANE) 2D: 118 ML
SL CV PED ECHO LEFT VENTRICLE SYSTOLIC VOLUME (MOD BIPLANE) 2D: 59 ML
TR MAX PG: 21 MMHG
TR PEAK VELOCITY: 2.3 M/S
TRICUSPID VALVE PEAK REGURGITATION VELOCITY: 2.27 M/S

## 2023-02-01 ENCOUNTER — REMOTE DEVICE CLINIC VISIT (OUTPATIENT)
Dept: CARDIOLOGY CLINIC | Facility: CLINIC | Age: 63
End: 2023-02-01

## 2023-02-01 DIAGNOSIS — Z95.810 AICD (AUTOMATIC CARDIOVERTER/DEFIBRILLATOR) PRESENT: Primary | ICD-10-CM

## 2023-02-02 NOTE — PROGRESS NOTES
Results for orders placed or performed in visit on 02/01/23   Cardiac EP device report    Narrative    MDT-DUAL CHAMBER ICD (VVIR MODE)/ ACTIVE SYSTEM IS MRI CONDITIONAL  CARELINK TRANSMISSION: BATTERY STATUS "15 MONS "  64%  ALL AVAILABLE LEAD PARAMETERS WITHIN NORMAL LIMITS  100% AF BURDEN; PT ON WARFARIN  OPTI-VOL WITHIN NORMAL LIMITS  NORMAL DEVICE FUNCTION   NC

## 2023-06-01 ENCOUNTER — IN-CLINIC DEVICE VISIT (OUTPATIENT)
Dept: CARDIOLOGY CLINIC | Facility: CLINIC | Age: 63
End: 2023-06-01

## 2023-06-01 DIAGNOSIS — Z95.810 PRESENCE OF AUTOMATIC CARDIOVERTER/DEFIBRILLATOR (AICD): Primary | ICD-10-CM

## 2023-06-01 NOTE — PROGRESS NOTES
Results for orders placed or performed in visit on 06/01/23   Cardiac EP device report    Narrative    MDT-DUAL CHAMBER ICD (VVIR MODE)/ ACTIVE SYSTEM IS MRI CONDITIONAL  DEVICE INTERROGATED IN THE [place] OFFICE  BATTERY VOLTAGE ADEQUATE (13 MOS)  AP: 0%  : 71 5% (>40%~MVP-OFF~VVIR/55)  ALL LEAD PARAMETERS WITHIN NORMAL LIMITS  1 AT/AF EPISODE W/ AF IN PROGRESS (HX OF SAME)  AF BURDEN: 100%  PT TAKES METOPROLOL TART, WARFARIN  EF: 40% (ECHO 11/22/22)  OPTI-VOL WITHIN NORMAL LIMITS  NO PROGRAMMING CHANGES MADE TO DEVICE PARAMETERS  APPROPRIATELY FUNCTIONING ICD    [address] [place] [address]

## 2023-09-05 ENCOUNTER — REMOTE DEVICE CLINIC VISIT (OUTPATIENT)
Dept: CARDIOLOGY CLINIC | Facility: CLINIC | Age: 63
End: 2023-09-05
Payer: MEDICARE

## 2023-09-05 DIAGNOSIS — Z95.810 PRESENCE OF AUTOMATIC CARDIOVERTER/DEFIBRILLATOR (AICD): Primary | ICD-10-CM

## 2023-09-05 PROCEDURE — 93296 REM INTERROG EVL PM/IDS: CPT | Performed by: INTERNAL MEDICINE

## 2023-09-05 PROCEDURE — 93295 DEV INTERROG REMOTE 1/2/MLT: CPT | Performed by: INTERNAL MEDICINE

## 2023-09-06 NOTE — PROGRESS NOTES
Results for orders placed or performed in visit on 09/05/23   Cardiac EP device report    Narrative    MDT-DUAL CHAMBER ICD (VVIR MODE)/ ACTIVE SYSTEM IS MRI CONDITIONAL  CARELINK TRANSMISSION: BATTERY VOLTAGE NEARING LUCILA. WILL SCHEDULE MONTHLY BATTERY CHECKS. (10 MONS)  74%. ALL AVAILABLE LEAD PARAMETERS WITHIN NORMAL LIMITS. 1 HVR EPISODE DETECTED NOTHING SINCE. 100% AF. PATIENT IS ON WARFARIN AND METOPROLOL TART. OPTI-VOL WITHIN NORMAL LIMITS. NORMAL DEVICE FUNCTION. ---MORIN
Vital Signs Last 24 Hrs  T(C): 36.8 (12 May 2022 23:26), Max: 36.8 (12 May 2022 23:26)  T(F): 98.2 (12 May 2022 23:26), Max: 98.2 (12 May 2022 23:26)  HR: 73 (12 May 2022 23:26) (73 - 73)  BP: 137/85 (12 May 2022 23:26) (137/85 - 137/85)  BP(mean): --  RR: 18 (12 May 2022 23:26) (18 - 18)  SpO2: 99% (12 May 2022 23:26) (99% - 99%)        Constitutional: NAD, A&O x3    Eyes: PERRLA, no conjuctivitis    Neck: no lymphadenopathy    Respiratory: +air entry, no rales, no rhonchi, no wheezes    Cardiovascular: +S1 and S2, regular rate and rhythm    Gastrointestinal: +BS, soft, (+) TTP RLQ, no rebound, not distended    Extremities:  no edema, no calf tenderness    Neurological: sensation intact, ROM equal B/L, CN II-XII intact    Skin: no rashes, normal turgor

## 2023-10-06 ENCOUNTER — REMOTE DEVICE CLINIC VISIT (OUTPATIENT)
Dept: CARDIOLOGY CLINIC | Facility: CLINIC | Age: 63
End: 2023-10-06

## 2023-10-06 DIAGNOSIS — Z95.810 PRESENCE OF AUTOMATIC CARDIOVERTER/DEFIBRILLATOR (AICD): Primary | ICD-10-CM

## 2023-10-06 PROCEDURE — RECHECK: Performed by: INTERNAL MEDICINE

## 2023-10-06 NOTE — PROGRESS NOTES
Results for orders placed or performed in visit on 10/06/23   Cardiac EP device report    Narrative    MDT-DUAL CHAMBER ICD (VVIR MODE)/ ACTIVE SYSTEM IS MRI CONDITIONAL  NON-BILLABLE CARELINK TRANSMISSION: BATTERY STATUS: BATTERY VOLTAGE NEARING LUCILA (9 MOS). WILL SCHEDULE MONTHLY BATTERY CHECKS. AP: 0%. : 73.3% (>40%~MVP-OFF~VVIR/55). ALL AVAILABLE LEAD PARAMETERS WITHIN NORMAL LIMITS. 2 AT/AF EPISODES W/ AF IN PROGRESS (HX OF SAME). AF BURDEN: 100%. PT TAKES WARFARIN, METOPROLOL TART. EF: 40% (ECHO 11/22/22 ). OPTI-VOL WITHIN NORMAL LIMITS. NORMAL DEVICE FUNCTION.  [address] [id number] [facility]

## 2023-11-06 ENCOUNTER — REMOTE DEVICE CLINIC VISIT (OUTPATIENT)
Dept: CARDIOLOGY CLINIC | Facility: CLINIC | Age: 63
End: 2023-11-06

## 2023-11-06 DIAGNOSIS — Z95.810 AICD (AUTOMATIC CARDIOVERTER/DEFIBRILLATOR) PRESENT: Primary | ICD-10-CM

## 2023-11-06 PROCEDURE — RECHECK: Performed by: INTERNAL MEDICINE

## 2023-11-06 NOTE — PROGRESS NOTES
Results for orders placed or performed in visit on 11/06/23   Cardiac EP device report    Narrative    MDT-DUAL CHAMBER ICD (VVIR MODE)/ ACTIVE SYSTEM IS MRI CONDITIONAL  CARELINK TRANSMISSION: BATTERY VOLTAGE NEARING LUCILA-8 MONS. WILL SCHEDULE MONTHLY BATTERY CHECKS.  69%. ALL AVAILABLE LEAD PARAMETERS WITHIN NORMAL LIMITS. NO SIGNIFICANT HIGH RATE EPISODES. OPTI-VOL WITHIN NORMAL LIMITS. NORMAL DEVICE FUNCTION.  NC

## 2023-12-05 ENCOUNTER — REMOTE DEVICE CLINIC VISIT (OUTPATIENT)
Dept: CARDIOLOGY CLINIC | Facility: CLINIC | Age: 63
End: 2023-12-05
Payer: MEDICARE

## 2023-12-05 DIAGNOSIS — Z95.810 PRESENCE OF AUTOMATIC CARDIOVERTER/DEFIBRILLATOR (AICD): Primary | ICD-10-CM

## 2023-12-05 PROCEDURE — 93295 DEV INTERROG REMOTE 1/2/MLT: CPT | Performed by: INTERNAL MEDICINE

## 2023-12-05 PROCEDURE — 93296 REM INTERROG EVL PM/IDS: CPT | Performed by: INTERNAL MEDICINE

## 2023-12-05 NOTE — PROGRESS NOTES
Results for orders placed or performed in visit on 12/05/23   Cardiac EP device report    Narrative    MDT-DUAL CHAMBER ICD (VVIR MODE)/ ACTIVE SYSTEM IS MRI CONDITIONAL  CARELINK TRANSMISSION: BATTERY VOLTAGE NEARING LUCILA (7 MOS)  WILL SCHEDULE MONTHLY BATTERY CHECKS. AP: 0%. : 72% (>40%~MVP~OFF~VVIR/55). ALL AVAILABLE LEAD PARAMETERS WITHIN NORMAL LIMITS. 1 AT/AF EPISODE W/ AF IN PROGRESS (HX OF SAME). AF BURDEN: 100%. PT TAKES WARFARIN, METOPROLOL TART. EF: 40% (ECHO 11/22/22). OPTI-VOL WITHIN NORMAL LIMITS. NORMAL DEVICE FUNCTION.  [id number] [address]

## 2024-01-05 ENCOUNTER — REMOTE DEVICE CLINIC VISIT (OUTPATIENT)
Dept: CARDIOLOGY CLINIC | Facility: CLINIC | Age: 64
End: 2024-01-05

## 2024-01-05 DIAGNOSIS — Z95.810 AICD (AUTOMATIC CARDIOVERTER/DEFIBRILLATOR) PRESENT: Primary | ICD-10-CM

## 2024-01-05 PROCEDURE — RECHECK: Performed by: INTERNAL MEDICINE

## 2024-01-05 NOTE — PROGRESS NOTES
Results for orders placed or performed in visit on 01/05/24   Cardiac EP device report    Narrative    MDT-DUAL CHAMBER ICD (VVIR MODE)/ ACTIVE SYSTEM IS MRI CONDITIONAL  CARELINK TRANSMISSION: BATTERY VOLTAGE NEARING LUCILA-6 MONS. WILL SCHEDULE MONTHLY BATTERY CHECKS.  73%. ALL AVAILABLE LEAD PARAMETERS WITHIN NORMAL LIMITS. 1 VHR NOTED; NO THERAPIES NEEDED. NSVT VS RVR CAN'T BE EXCLUDED. PT ON METO TART & EF 40% (ECHO 2022). OPTI-VOL WITHIN NORMAL LIMITS. NORMAL DEVICE FUNCTION. NC

## 2024-02-06 NOTE — PROGRESS NOTES
Cardiology Consultation     Js Barrera  [id number]  1960  [facility] CARDIOLOGY [place]  [address] [facility] [id number]      1. Alcoholic cardiomyopathy (HCC)        2. Permanent atrial fibrillation (HCC)        3. Atherosclerosis of arteries of extremities (HCC)        4. Ventricular tachycardia (HCC)        5. Cerebrovascular accident (CVA) due to embolism of middle cerebral artery, unspecified blood vessel laterality (HCC)        6. Mixed hyperlipidemia        7. Thrombocytopenia (HCC)        8. ICD (implantable cardioverter-defibrillator) in place                Discussion/Summary:  Nonischemic dilated cardiomyopathy   -presumed to be alcoholic cardiomyopathy  -prior alcohol abuse before CVA  -TTE 08/03/2017 EF 50-55%, moderate LA, mild to moderate RA, mild-to-moderate MR, mild TR  -TTE 11/22/2022 showed EF 40%, mildly dilated RV with mildly reduced function, moderate LA and RA, mild to moderate MR, mild TR  Ventricular tachycardia   -s/p Medtronic dual chamber ICD  -previously on amiodarone, however discontinued years ago and has not had significant VT  - Device nearing LUCILA (6 months) on last interrogation on 1/5/2024, V paced 73%  Permanent atrial fibrillation  -Continue with Lopressor 25 mg b.i.d.  -anticoagulated on warfarin  -rate well controlled  Hyperlipidemia   -Continue with atorvastatin 10 mg daily  -Lipid profile 12/18/2023 showed total cholesterol 63, triglycerides 65, HDL 34, LDL 16  Prior CVA to [facility]  -in setting of atrial fibrillation and patient noncompliant with warfarin  -residual left-sided weakness and patient also nonverbal  Thrombocytopenia   -platelet count 95 on CBC on 12/18/2023  -Counts remain relatively stable around 100  Type 2 diabetes   -hemoglobin A1c 6.1 on 12/22/2021    Follow-up in 9 months.    History of Present Illness:  Js Barrera is a 63 y.o. year old male with a past medical history of alcoholic dilated cardiomyopathy, permanent atrial  fibrillation, VT s/p ICD, hyperlipidemia, type 2 diabetes, thrombocytopenia and prior CVA to [facility].      Patient is nonverbal, but was able to nod yes or no to questions.  He is accompanied by his .  She was unaware of any complaints, but does not care for the patient at the facility.  He denied any chest pain, or other cardiac symptoms currently.    Patient Active Problem List   Diagnosis    Alcoholic cardiomyopathy (HCC)    Permanent atrial fibrillation (HCC)    ICD (implantable cardioverter-defibrillator) in place    Ventricular tachycardia (HCC)    Mixed hyperlipidemia    Thrombocytopenia (HCC)    Cerebrovascular accident (CVA) due to embolism of middle cerebral artery (HCC)    Atherosclerosis of arteries of extremities (HCC)     Past Medical History:   Diagnosis Date    Atrial fibrillation (HCC)     Bipolar affective disorder (HCC)     Cardiomyopathy (HCC)     Depression     Diabetes mellitus (HCC)     Dysphagia     Hyperlipidemia     Hypertension     ICD (implantable cardioverter-defibrillator) in place     Pneumothorax     Stroke (HCC)     Upper GI bleed     Urinary incontinence      Social History     Socioeconomic History    Marital status:      Spouse name: Not on file    Number of children: Not on file    Years of education: Not on file    Highest education level: Not on file   Occupational History    Not on file   Tobacco Use    Smoking status: Former    Smokeless tobacco: Never   Substance and Sexual Activity    Alcohol use: No     Comment: former abuse    Drug use: No    Sexual activity: Not on file   Other Topics Concern    Not on file   Social History Narrative    Not on file     Social Determinants of Health     Financial Resource Strain: Not on file   Food Insecurity: Not on file   Transportation Needs: Not on file   Physical Activity: Not on file   Stress: Not on file   Social Connections: Not on file   Intimate Partner Violence: Not on file   Housing Stability: Not on file      Family  History   Problem Relation Age of Onset    Coronary artery disease Family      Past Surgical History:   Procedure Laterality Date    CARDIAC DEFIBRILLATOR PLACEMENT      IVC FILTER INSERTION      KNEE SURGERY      UMBILICAL HERNIA REPAIR         Current Outpatient Medications:     acetaminophen (TYLENOL) 325 mg tablet, Take 2 tablets by mouth every 6 (six) hours as needed for mild pain, Disp: 30 tablet, Rfl: 0    atorvastatin (LIPITOR) 10 mg tablet, 0.5 tablets (5 mg total) by Per G Tube route daily, Disp: , Rfl:     bisacodyl (FLEET) 10 MG/30ML ENEM, Insert 10 mg into the rectum if needed for constipation, Disp: , Rfl:     chlorhexidine (PERIDEX) 0.12 % solution, , Disp: , Rfl:     gabapentin (NEURONTIN) 250 mg/5 mL solution, 6 mL by Per G Tube route daily, Disp: , Rfl:     magnesium hydroxide (MILK OF MAGNESIA) 2400 mg/10 mL SUSP concentrated oral suspension, Take by mouth, Disp: , Rfl:     metFORMIN (FORTAMET) 500 MG (OSM) 24 hr tablet, Take 250 mg by mouth 2 (two) times a day Give per G tube, Disp: , Rfl:     metoprolol tartrate (LOPRESSOR) 25 mg tablet, 25 mg by Per G Tube route every 12 (twelve) hours, Disp: , Rfl:     omeprazole (PriLOSEC) 20 mg delayed release capsule, Take 20 mg by mouth daily, Disp: , Rfl:     SODIUM PHOSPHATES RE, Insert into the rectum if needed, Disp: , Rfl:     warfarin (COUMADIN) 2.5 mg tablet, 2.5 mg by Per G Tube route daily Give with 4mg = total 6.5, Disp: , Rfl:     warfarin (COUMADIN) 4 mg tablet, 4 mg by Per G Tube route daily Give with 2.5 mg dose = total 6.5mg, Disp: , Rfl:     citalopram (CeleXA) 10 mg tablet, 10 mg by Per G Tube route (Patient not taking: Reported on 1/10/2022), Disp: , Rfl:   No Known Allergies      Labs:  Lab Results   Component Value Date    ALT 16 08/26/2023    AST 18 08/26/2023    BUN 17 08/26/2023    CALCIUM 9.8 08/26/2023     08/26/2023    CHOL 135 07/21/2014    CO2 31 08/26/2023    CREATININE 0.78 08/26/2023    HDL 44 07/21/2014    HCT 45  "08/14/2017    HGB 15.3 08/14/2017    HGBA1C 6.3 (H) 12/18/2023     (L) 08/14/2017    K 4.9 08/26/2023     07/21/2014    TRIG 214 07/21/2014    WBC 4.81 08/14/2017       Imaging: No results found.      Review of Systems:  Review of Systems   Unable to perform ROS: Patient nonverbal     EP device report 08/26/2022 Medtronic dual chamber ICD   CARELINK TRANSMISSION: BATTERY STATUS \"1.6 YRS.\"  73%. ALL AVAILABLE LEAD PARAMETERS WITHIN NORMAL LIMITS. NO SIGNIFICANT HIGH RATE EPISODES. OPTI-VOL WITHIN NORMAL LIMITS. NORMAL DEVICE FUNCTION. NC    Vitals:    02/07/24 0946   BP: 100/70   Pulse: 66   SpO2: 98%        Vitals:             Physical Exam:  General appearance:  Appears stated age, alert, well appearing and in no distress  HEENT:  PERRLA, EOMI, no scleral icterus, no conjunctival pallor  NECK:  Supple, No elevated JVP, no thyromegaly, no carotid bruits  HEART:  Regular rate and rhythm, normal S1/S2, no S3/S4, no murmur or rub  LUNGS: Grossly clear to auscultation bilaterally  ABDOMEN:  Soft, non-tender, positive bowel sounds, no rebound or guarding, no organomegaly   EXTREMITIES:  No edema  VASCULAR:  Normal pedal pulses   SKIN: No lesions or rashes on exposed skin  NEURO:  CN II-XII intact, left upper extremity paralysis, left lower extremity weakness from prior CVA    "

## 2024-02-07 ENCOUNTER — REMOTE DEVICE CLINIC VISIT (OUTPATIENT)
Dept: CARDIOLOGY CLINIC | Facility: CLINIC | Age: 64
End: 2024-02-07

## 2024-02-07 ENCOUNTER — OFFICE VISIT (OUTPATIENT)
Dept: CARDIOLOGY CLINIC | Facility: CLINIC | Age: 64
End: 2024-02-07
Payer: MEDICARE

## 2024-02-07 VITALS — HEART RATE: 66 BPM | DIASTOLIC BLOOD PRESSURE: 70 MMHG | SYSTOLIC BLOOD PRESSURE: 100 MMHG | OXYGEN SATURATION: 98 %

## 2024-02-07 DIAGNOSIS — E78.2 MIXED HYPERLIPIDEMIA: ICD-10-CM

## 2024-02-07 DIAGNOSIS — I42.6 ALCOHOLIC CARDIOMYOPATHY (HCC): Primary | ICD-10-CM

## 2024-02-07 DIAGNOSIS — Z95.810 AICD (AUTOMATIC CARDIOVERTER/DEFIBRILLATOR) PRESENT: Primary | ICD-10-CM

## 2024-02-07 DIAGNOSIS — D69.6 THROMBOCYTOPENIA (HCC): ICD-10-CM

## 2024-02-07 DIAGNOSIS — I48.21 PERMANENT ATRIAL FIBRILLATION (HCC): ICD-10-CM

## 2024-02-07 DIAGNOSIS — I47.20 VENTRICULAR TACHYCARDIA (HCC): ICD-10-CM

## 2024-02-07 DIAGNOSIS — I70.209 ATHEROSCLEROSIS OF ARTERIES OF EXTREMITIES (HCC): ICD-10-CM

## 2024-02-07 DIAGNOSIS — Z95.810 ICD (IMPLANTABLE CARDIOVERTER-DEFIBRILLATOR) IN PLACE: ICD-10-CM

## 2024-02-07 DIAGNOSIS — I63.419 CEREBROVASCULAR ACCIDENT (CVA) DUE TO EMBOLISM OF MIDDLE CEREBRAL ARTERY, UNSPECIFIED BLOOD VESSEL LATERALITY (HCC): ICD-10-CM

## 2024-02-07 PROCEDURE — RECHECK: Performed by: INTERNAL MEDICINE

## 2024-02-07 PROCEDURE — 99214 OFFICE O/P EST MOD 30 MIN: CPT | Performed by: STUDENT IN AN ORGANIZED HEALTH CARE EDUCATION/TRAINING PROGRAM

## 2024-02-07 RX ORDER — CHLORHEXIDINE GLUCONATE ORAL RINSE 1.2 MG/ML
SOLUTION DENTAL
COMMUNITY
Start: 2024-01-30

## 2024-02-07 NOTE — PROGRESS NOTES
Results for orders placed or performed in visit on 02/07/24   Cardiac EP device report    Narrative    MDT-DUAL CHAMBER ICD (VVIR MODE)/ ACTIVE SYSTEM IS MRI CONDITIONAL  CARELINK TRANSMISSION TO CHECK BATTERY STATUS- NB: BATTERY VOLTAGE NEARING LUCILA (5 MOS). WILL SCHEDULE MONTHLY BATTERY CHECKS. -68% (>40% VVIR@55PPM). ALL AVAILABLE LEAD PARAMETERS WITHIN NORMAL LIMITS. NO SIGNIFICANT HIGH RATE EPISODES. PT IN CHRONIC AF & ON WARFARIN & METOPROLOL. OPTI-VOL WITHIN NORMAL LIMITS. NORMAL DEVICE FUNCTION. GV

## 2024-03-05 ENCOUNTER — REMOTE DEVICE CLINIC VISIT (OUTPATIENT)
Dept: CARDIOLOGY CLINIC | Facility: CLINIC | Age: 64
End: 2024-03-05

## 2024-03-05 DIAGNOSIS — Z95.810 ICD (IMPLANTABLE CARDIOVERTER-DEFIBRILLATOR) IN PLACE: Primary | ICD-10-CM

## 2024-03-05 PROCEDURE — RECHECK: Performed by: INTERNAL MEDICINE

## 2024-03-05 NOTE — PROGRESS NOTES
Results for orders placed or performed in visit on 03/05/24   Cardiac EP device report    Narrative    MDT-DUAL CHAMBER ICD (VVIR MODE)/ ACTIVE SYSTEM IS MRI CONDITIONAL  NON-BILLABLE CARELINK TRANSMISSION : BATTERY VOLTAGE NEARING LUCILA (4 MOS). WILL SCHEDULE MONTHLY BATTERY CHECKS.  57.6% (>40%/ VVIR 55PPM). ALL AVAILABLE LEAD PARAMETERS WITHIN NORMAL LIMITS. NO SIGNIFICANT HIGH RATE EPISODES. 2 AT/AF EPISODES WITH CHRONIC AF  (BURDEN 100%); PATIENT TAKING WARFARIN, METOPROLOL TART. OPTI-VOL WITHIN NORMAL LIMITS.  NORMAL DEVICE FUNCTION.  ES

## 2024-04-05 ENCOUNTER — REMOTE DEVICE CLINIC VISIT (OUTPATIENT)
Dept: CARDIOLOGY CLINIC | Facility: CLINIC | Age: 64
End: 2024-04-05
Payer: MEDICARE

## 2024-04-05 DIAGNOSIS — Z95.810 ICD (IMPLANTABLE CARDIOVERTER-DEFIBRILLATOR) IN PLACE: Primary | ICD-10-CM

## 2024-04-05 PROCEDURE — 93296 REM INTERROG EVL PM/IDS: CPT | Performed by: INTERNAL MEDICINE

## 2024-04-05 PROCEDURE — 93295 DEV INTERROG REMOTE 1/2/MLT: CPT | Performed by: INTERNAL MEDICINE

## 2024-04-05 NOTE — PROGRESS NOTES
Results for orders placed or performed in visit on 04/05/24   Cardiac EP device report    Narrative    MDT-DUAL CHAMBER ICD (VVIR MODE)/ ACTIVE SYSTEM IS MRI CONDITIONAL  CARELINK TRANSMISSION: BATTERY VOLTAGE NEARING LUCILA (~ 3 MOS). WILL SCHEDULE MONTHLY BATTERY CHECKS.  67.3% (VVIR 55 PPM). ALL AVAILABLE LEAD PARAMETERS WITHIN NORMAL LIMITS. 2 AT/AF EPISODES WITH PERSISTENT AF IN PROGRESS - BURDEN 100%; PATIENT TAKING WARFARIN, METOPROLOL TART;  OPTI-VOL WITHIN NORMAL LIMITS. NORMAL DEVICE FUNCTION.  ES

## 2024-05-06 ENCOUNTER — REMOTE DEVICE CLINIC VISIT (OUTPATIENT)
Dept: CARDIOLOGY CLINIC | Facility: CLINIC | Age: 64
End: 2024-05-06

## 2024-05-06 DIAGNOSIS — Z95.810 ICD (IMPLANTABLE CARDIOVERTER-DEFIBRILLATOR) IN PLACE: Primary | ICD-10-CM

## 2024-05-06 PROCEDURE — RECHECK: Performed by: INTERNAL MEDICINE

## 2024-05-06 NOTE — PROGRESS NOTES
Results for orders placed or performed in visit on 05/06/24   Cardiac EP device report    Narrative    MDT-DUAL CHAMBER ICD (VVIR MODE)/ ACTIVE SYSTEM IS MRI CONDITIONAL  CARELINK TRANSMISSION: BATTERY VOLTAGE NEARING LUCILA(2 MTHS). WILL SCHEDULE MONTHLY BATTERY CHECKS.  74.8% (>40%/VVIR 60) ALL AVAILABLE LEAD PARAMETERS WITHIN NORMAL LIMITS. NO NEW SIGNIFICANT HIGH RATE EPISODES. CORVUE IMPEDANCE MONITORING WITHIN NORMAL LIMITS. NORMAL DEVICE FUNCTION. AM

## 2024-06-06 ENCOUNTER — REMOTE DEVICE CLINIC VISIT (OUTPATIENT)
Dept: CARDIOLOGY CLINIC | Facility: CLINIC | Age: 64
End: 2024-06-06

## 2024-06-06 DIAGNOSIS — Z95.810 PRESENCE OF AUTOMATIC CARDIOVERTER/DEFIBRILLATOR (AICD): Primary | ICD-10-CM

## 2024-06-06 PROCEDURE — RECHECK: Performed by: INTERNAL MEDICINE

## 2024-06-06 NOTE — PROGRESS NOTES
Results for orders placed or performed in visit on 06/06/24   Cardiac EP device report    Narrative    MDT-DUAL CHAMBER ICD (VVIR MODE)/ ACTIVE SYSTEM IS MRI CONDITIONAL  NON-BILLABLE CARELINK TRANSMISSION: BATTERY STATUS: BATTERY VOLTAGE NEARING LUCILA (2 MOS).  WILL SCHEDULE MONTHLY BATTERY CHECKS. AP: 0%. : 83% (>40%~VVIR/55). ALL AVAILABLE LEAD PARAMETERS WITHIN NORMAL LIMITS. 3 AT/AF EPISODES W/ AF IN PROGRESS (HX OF SAME). AF BURDEN: 100%. PT TAKES WARFARIN, METOPROLOL TART. EF: 40% (ECHO 11/22/22). OPTI-VOL WITHIN NORMAL LIMITS. NORMAL DEVICE FUNCTION. CH

## 2024-08-02 ENCOUNTER — TELEPHONE (OUTPATIENT)
Dept: CARDIOLOGY CLINIC | Facility: CLINIC | Age: 64
End: 2024-08-02

## 2024-08-02 ENCOUNTER — PREP FOR PROCEDURE (OUTPATIENT)
Dept: CARDIOLOGY CLINIC | Facility: CLINIC | Age: 64
End: 2024-08-02

## 2024-08-02 ENCOUNTER — OFFICE VISIT (OUTPATIENT)
Dept: CARDIOLOGY CLINIC | Facility: CLINIC | Age: 64
End: 2024-08-02
Payer: MEDICARE

## 2024-08-02 VITALS
DIASTOLIC BLOOD PRESSURE: 80 MMHG | WEIGHT: 213.4 LBS | HEIGHT: 74 IN | BODY MASS INDEX: 27.39 KG/M2 | SYSTOLIC BLOOD PRESSURE: 110 MMHG | HEART RATE: 69 BPM

## 2024-08-02 DIAGNOSIS — I47.20 VENTRICULAR TACHYCARDIA (HCC): ICD-10-CM

## 2024-08-02 DIAGNOSIS — I42.6 ALCOHOLIC CARDIOMYOPATHY (HCC): Primary | ICD-10-CM

## 2024-08-02 DIAGNOSIS — E11.40 TYPE 2 DIABETES MELLITUS WITH DIABETIC NEUROPATHY, UNSPECIFIED WHETHER LONG TERM INSULIN USE (HCC): ICD-10-CM

## 2024-08-02 DIAGNOSIS — Z95.810 ICD (IMPLANTABLE CARDIOVERTER-DEFIBRILLATOR) IN PLACE: Primary | ICD-10-CM

## 2024-08-02 DIAGNOSIS — I70.209 ATHEROSCLEROSIS OF ARTERIES OF EXTREMITIES (HCC): ICD-10-CM

## 2024-08-02 DIAGNOSIS — I63.419 CEREBROVASCULAR ACCIDENT (CVA) DUE TO EMBOLISM OF MIDDLE CEREBRAL ARTERY, UNSPECIFIED BLOOD VESSEL LATERALITY (HCC): ICD-10-CM

## 2024-08-02 DIAGNOSIS — I48.21 PERMANENT ATRIAL FIBRILLATION (HCC): Primary | ICD-10-CM

## 2024-08-02 DIAGNOSIS — Z95.810 ICD (IMPLANTABLE CARDIOVERTER-DEFIBRILLATOR) IN PLACE: ICD-10-CM

## 2024-08-02 DIAGNOSIS — I48.21 PERMANENT ATRIAL FIBRILLATION (HCC): ICD-10-CM

## 2024-08-02 DIAGNOSIS — D69.6 THROMBOCYTOPENIA (HCC): ICD-10-CM

## 2024-08-02 DIAGNOSIS — E78.2 MIXED HYPERLIPIDEMIA: ICD-10-CM

## 2024-08-02 DIAGNOSIS — I50.22 CHRONIC SYSTOLIC CHF (CONGESTIVE HEART FAILURE) (HCC): ICD-10-CM

## 2024-08-02 PROCEDURE — 93000 ELECTROCARDIOGRAM COMPLETE: CPT | Performed by: PHYSICIAN ASSISTANT

## 2024-08-02 PROCEDURE — 99214 OFFICE O/P EST MOD 30 MIN: CPT | Performed by: PHYSICIAN ASSISTANT

## 2024-08-02 NOTE — H&P (VIEW-ONLY)
EPS Consultation/New Patient Evaluation   Heart & Vascular Center  [facility] [address], [place] [address]    Name: Js Barrera  : 1960  MRN: [id number]    ASSESSMENT:  1. Alcoholic cardiomyopathy (HCC)  POCT ECG    Echo complete w/ contrast if indicated      2. Permanent atrial fibrillation (HCC)        3. Ventricular tachycardia (HCC)        4. Cerebrovascular accident (CVA) due to embolism of middle cerebral artery, unspecified blood vessel laterality (HCC)        5. Atherosclerosis of arteries of extremities (HCC)        6. Thrombocytopenia (HCC)        7. ICD (implantable cardioverter-defibrillator) in place  POCT ECG    Echo complete w/ contrast if indicated      8. Mixed hyperlipidemia        9. Chronic systolic CHF (congestive heart failure) (HCC)        10. Type 2 diabetes mellitus with diabetic neuropathy, unspecified whether long term insulin use (HCC)            PLAN:  Medtronic dual chamber ICD at RRT since 2024  -- initial implant 2012  -- s/p generator change 2017 by Dr. Augustine  -- permanent Afib, programmed VVIR  bpm  --  75%  -- decline in EF from 50-55% in  to now 40% 2022  -- check echo to eval EF  -- schedule generator change. If EF worse, consider upgrade to CRT at time of gen change (will need venogram)    NICM EF 40%, HFrEF  -- presumed alcoholic cardiomyopathy (alcohol use prior to CVA)  -- decline in EF from 50-55% in  to now 40% by echo 2023  -- on metoprolol  -- possible pacing induced cardiomyopathy?    VT  -- previously on amiodarone but discontinued years ago with no recurrent VT  -- continue metoprolol  -- s/p dual chamber ICD    Permanent Afib on Eliquis  --  75% of the time, device programmed VVIR  -- rates controlled with metoprolol  -- previously on warfarin, now on Eliquis    HLD, on atorvastatin    MCA CVA  -- stroke in setting of Afib and noncompliance with warfarin  --  residual left sided weakness, nonverbal, deaf  -- patient is able to read lips    Thrombocytopenia  -- platelets around 100    DM2, hemoglobin A1C 6.3    I discussed generator change with Js today. I also attempted to call his sister but was unable to reach her. We discussed the indications, risk, benefits of generator change and he would like to proceed.  Given his decline in EF to 40% with last echocardiogram in 2022, I have ordered a repeat echocardiogram to evaluate his EF.  If his EF is low, we could consider upgrade to CRT.  He would also require venogram prior to the procedure.  I will discuss this with Dr. Augustine.    He is on Eliquis for permanent A-fib.  The device is programmed VVIR.  He will hold Eliquis the morning of the procedure.  Given that he had a prior stroke, we will minimize the time off his anticoagulation.  He will hold metformin and any other diabetes medications the morning prior to the procedure.    He lives in [facility].  His caretaker is present for the visit today.  He has a history of thrombocytopenia but platelets have remained stable at approximately 100.    After the generator change, he will follow-up with the device clinic in 2 weeks for a wound check and device interrogation.  Will continue to follow-up with his cardiologist Dr. Smith.     CC/HPI:   Js Barrera is a 63 year old male with a history of alcoholic dilated NICM EF 40%, permanent atrial fibrillation on warfarin, VT s/p Medtronic dual chamber ICD, HLD, DM2, thrombocytopenia and prior MCA CVA who present for follow up regarding his defibrillator.    He follows with Dr. Smith.  He has a history of VT and initially underwent implantation of a Medtronic dual-chamber ICD on 1/23/2012.  He underwent generator change on 8/14/2017 by Dr. Augustine.  He has permanent A-fib anticoagulated with warfarin.  Therefore, the device is programmed VVIR.  He V paces 75% of the time.  He was previously on amiodarone  for VT.  This was discontinued several years ago and he has not had recurrence.    He has a nonischemic cardiomyopathy which was thought to be related to alcohol.  Echo in 2017 showed an EF of 50 to 55%.  Echocardiogram in November 2022 documented an EF of 40%.  He is now V paced 75% of time.    He is followed by our device clinic.  The device reached RRT on 7/2/2024.    He presents today for evaluation of his ICD.  He is nonverbal and deaf but is able to read lips.  He is accompanied by his caregiver. He resides at [facility].  He reports feeling well.  He denies any problems with the implant site.  He denies chest pain, shortness of breath, lightheadedness, dizziness, orthopnea, or PND.  His sister is his primary medical contact.    EK 8/2/2024: Afib with ventricular paced rhythm HR 69 bpm    Echo 11/22/2022:    Left Ventricle: Left ventricular cavity size is normal. Wall thickness is moderately increased. Systolic function is difficult to evaluate due to significant foreshortening of the apex.  Grossly, it appeared moderately reduced.  The left ventricular ejection fraction is estimated at 40%. Wall motion is normal. Diastolic function is normal.    The following segments are akinetic: basal inferior, apical lateral and apex.    The following segments are hypokinetic: apical septal.    Right Ventricle: Right ventricular cavity size is mildly dilated. Systolic function is mildly reduced.    Left Atrium: The atrium is moderately dilated.    Right Atrium: The atrium is moderately dilated.    Mitral Valve: There is mild to moderate regurgitation.    Tricuspid Valve: There is mild regurgitation.    Echo 8/3/2017: EF 50-55%, moderate LA, mild to moderate RA, mild-to-moderate MR, mild TR     Review of Systems   Constitutional: Negative.    HENT: Negative.     Eyes: Negative.    Respiratory: Negative.     Cardiovascular: Negative.    Gastrointestinal: Negative.    Endocrine: Negative.    Genitourinary:  "Negative.    Musculoskeletal: Negative.    Skin: Negative.    Neurological: Negative.    Hematological: Negative.    Psychiatric/Behavioral: Negative.       Objective:     Vitals: Blood pressure 110/80, pulse 69, height 6' 2\" (1.88 m), weight 96.8 kg (213 lb 6.4 oz)., Body mass index is 27.4 kg/m².,      Physical Exam:   GEN: NAD, alert and oriented x 3, well appearing  SKIN: warm, dry without significant lesions or rashes  HEENT: NCAT  NECK: supple, no JVD appreciated  CARDIOVASCULAR: RRR, normal S1, S2 without murmurs, rubs, or gallops   LUNGS: CTA bilaterally without wheezes, rhonchi, or rales  ABDOMEN: Soft, nontender, nondistended.   EXTREMITIES/VASCULAR: perfused without clubbing, cyanosis, or LE edema b/l  PSYCH: Normal mood and affect  NEURO: CN ll-Xll grossly intact    Medications:      Current Outpatient Medications:     acetaminophen (TYLENOL) 325 mg tablet, Take 2 tablets by mouth every 6 (six) hours as needed for mild pain, Disp: 30 tablet, Rfl: 0    apixaban (Eliquis) 5 mg, Take 5 mg by mouth 2 (two) times a day, Disp: , Rfl:     atorvastatin (LIPITOR) 10 mg tablet, 0.5 tablets (5 mg total) by Per G Tube route daily, Disp: , Rfl:     bisacodyl (FLEET) 10 MG/30ML ENEM, Insert 10 mg into the rectum if needed for constipation, Disp: , Rfl:     chlorhexidine (PERIDEX) 0.12 % solution, , Disp: , Rfl:     gabapentin (NEURONTIN) 250 mg/5 mL solution, 6 mL by Per G Tube route daily, Disp: , Rfl:     magnesium hydroxide (MILK OF MAGNESIA) 2400 mg/10 mL SUSP concentrated oral suspension, Take by mouth, Disp: , Rfl:     metFORMIN (FORTAMET) 500 MG (OSM) 24 hr tablet, Take 250 mg by mouth in the morning Give per G tube, Disp: , Rfl:     metoprolol tartrate (LOPRESSOR) 25 mg tablet, 25 mg by Per G Tube route every 12 (twelve) hours, Disp: , Rfl:     omeprazole (PriLOSEC) 20 mg delayed release capsule, Take 10 mg by mouth daily, Disp: , Rfl:        Historical Information   Past Medical History:   Diagnosis Date    " Atrial fibrillation (HCC)     Bipolar affective disorder (HCC)     Cardiomyopathy (HCC)     Depression     Diabetes mellitus (HCC)     Dysphagia     Hyperlipidemia     Hypertension     ICD (implantable cardioverter-defibrillator) in place     Pneumothorax     Stroke (HCC)     Upper GI bleed     Urinary incontinence        Past Surgical History:   Procedure Laterality Date    CARDIAC DEFIBRILLATOR PLACEMENT      IVC FILTER INSERTION      KNEE SURGERY      UMBILICAL HERNIA REPAIR         Social History     Substance and Sexual Activity   Alcohol Use No    Comment: former abuse     Social History     Substance and Sexual Activity   Drug Use No     Social History     Tobacco Use   Smoking Status Former   Smokeless Tobacco Never       Family History   Problem Relation Age of Onset    Coronary artery disease Family        Labs & Results:  Below is the patient's most recent value for Albumin, ALT, AST, BUN, Calcium, Chloride, Cholesterol, CO2, Creatinine, GFR, Glucose, HDL, Hematocrit, Hemoglobin, Hemoglobin A1C, LDL, Magnesium, Phosphorus, Platelets, Potassium, PSA, Sodium, Triglycerides, and WBC.   Lab Results   Component Value Date    ALT 16 08/26/2023    AST 18 08/26/2023    BUN 16 12/18/2023    CALCIUM 8.8 12/18/2023     12/18/2023    CHOL 135 07/21/2014    CO2 26 12/18/2023    CREATININE 0.75 12/18/2023    HDL 44 07/21/2014    HCT 45 08/14/2017    HGB 15.3 08/14/2017    HGBA1C 6.3 (H) 12/18/2023    MG 1.9 03/18/2024     (L) 08/14/2017    K 4.1 12/18/2023     07/21/2014    TRIG 214 07/21/2014    WBC 4.81 08/14/2017     Note: for a comprehensive list of the patient's lab results, access the Results Review activity.

## 2024-08-02 NOTE — TELEPHONE ENCOUNTER
Patient scheduled for gen change device on 8/20/24 at [facility] with Dr. Goel  Echo scheduled for 8/7 @ [facility]     Patient aware of all general instructions.    Medication holds:   Metformin - Take your regular dose day/evening before procedure and do not take morning of procedure.      Eliquis - per Divya Larios to hold morning of procedure.       Labs to be done on   CMP / CBC (FASTING 8 HOURS)        Dr. Goel, please advise on eliquis hold? Venogram order has been placed.     Thank you,  Kaitlin Rojas
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
## 2024-08-02 NOTE — PATIENT INSTRUCTIONS
The battery in the defibrillator is at the time where it needs to be replaced  Schedule echocardiogram to check your heart function  We will schedule generator change with one of our doctors  Follow up after the procedure in 2 weeks with the device clinic to check the incision

## 2024-08-02 NOTE — LETTER
2024               DEVICE PROCEDURE INSTRUCTIONS    Js Barrera   : 1960  MRN: [id number]  [address] [facility] [id number]    Procedure: GEN CHANGE      Procedure Date: 2024    Location: [facility]  Address: [address] [place] [address], [place] [address]        Labs to be done no later than 24 : CMP /  CBC (FASTING 8 HOURS)    BLOOD THINNER INSTRUCTIONS (Coumadin / Warfarin / Pradaxa / Eliquis / Xarelto):   Eliquis -     Medication Hold:   Metformin - Take your regular dose day/evening before procedure and do not take morning of procedure.          Arrival Time: The Hospital will contact you the day prior to your procedure, usually between 4PM - 6PM to instruct you on the time and place to report. If you do not hear from a [facility]  by 6PM the evening prior to your procedure, please contact the campus you are scheduled at.     DO NOT drink or eat ANYTHING after midnight the night prior to your procedure. You may have a minimal amount of water with your AM medications.    Arrange for a responsible adult to drive you to and from the hospital.    You should continue to take your morning medications with a sip of water UNLESS ADVISED OTHERWISE.       DO NOT stop taking Plavix or Aspirin unless advised otherwise.     If you are currently taking Fish Oil, Krill oil and/or Vitamin E please DO NOT TAKE FOR A WEEK PRIOR TO PROCEDURE.     If you are diabetic, DO NOT take any of your diabetic pills the morning of your procedure. Oral diabetic medications may include Glucophage, Prandin, Glyburide, Micronase, Avandia, Glucovance, Precose, Glynase, and Starlix.     Bring a list of daily medications, vitamins, minerals, herbals and nutritional supplements you take. Please include dosages and the times you take them each day.     If you are packing an overnight bag, pack minimal clothing, you will be given hospital sleepwear.   DO NOT bring money,  valuables or jewelry. The wedding band is ok.     If you use CPAP machine, bring it to the hospital.     Bring your Photo ID and Insurance cards with you.     Please notify us if you have been admitted to the hospital within 30 days.    Pre-Operative Showering Instructions. ONLY FOR DEVICE PROCEDURE.    The two nights prior to your procedure, take a shower each night using the special antiseptic body scrub (Chlorhexidine Scrub Brush) you received from the office or hospital. This scrub will replace your soap at home, the sponge is pre-filled with soap.     The scrub brushes are single use only and should be discarded after use.     Shampoo your hair with regular shampoo and rinse completely before using the antiseptic scrub brush.     Using the sponge side of the scrub brush to wash your entire body from your neck down, with special attention to your armpits, chest and groin area. DO NOT USE the scrub on your face and avoid any open wounds. Do not use any other soap or wash your skin.    DO NOT USE any lotion, powder, deodorant or perfume of any kind on your skin after you shower.    AFTER THE FIRST NIGHT of using the scrub, change your bed linen sheets to a fresh clean set and clean pajamas for bedtime.    AFTER THE SECOND NIGHT of using the scrub, use clean pajamas for bedtime.    DO NOT SHOWER THE MORNING OF SURGERY, you will be prepped and cleansed at the hospital prior to your procedure.                                                     PLEASE  THE SPONGES AT YOUR MOST CONVENIENT [facility] CARDIOLOGY OFFICE.      FAILURE TO FOLLOW ANY OF THESE INSTRUCTIONS COULD RESULT IN THE CANCELLATION OF YOUR PROCEDURE      Please call  [phone] ([place]) or [phone] ([place]) if you do not hear from the  by 6:00PM the night before your procedure.    All patients enter through ENTRANCE B.  Parking is available free of charge or park on Parking Deck B.        Thank you,   Kaitlin  Bob  Surgery Coordinator  [facility]   [facility]  [place] [address]  Teams: [phone]

## 2024-08-04 DIAGNOSIS — I48.21 PERMANENT ATRIAL FIBRILLATION (HCC): Primary | ICD-10-CM

## 2024-08-05 NOTE — TELEPHONE ENCOUNTER
I did place the order for the venogram should I cancel that? Please advise.   Thank you,   Kaitlin

## 2024-08-06 ENCOUNTER — TELEPHONE (OUTPATIENT)
Dept: CARDIOLOGY CLINIC | Facility: CLINIC | Age: 64
End: 2024-08-06

## 2024-08-06 NOTE — TELEPHONE ENCOUNTER
Returned call to Dereje to discuss Roberta's response --    Roberta Jimenez      Yes, device will tone/beep only once daily at 9:10 a.m. when LUCILA is reached.  It would need to be disabled in-clinic with a  if this is an issue for the patient or staff. Otherwise, no harm to just leave it on until the gen change date.    Please let me know if I can be of any other assistance with questions.     Was told he's on break and to call back in a little bit. Will return call later.

## 2024-08-06 NOTE — TELEPHONE ENCOUNTER
History  Chief Complaint   Patient presents with    Back Pain     B/L lower back pain with radiation to R glute, R leg for 1 week  No known injury  Back Pain   Location:  Lumbar spine  Quality:  Aching, shooting and stabbing  Radiates to:  R posterior upper leg and R thigh  Pain severity:  Severe  Onset quality:  Gradual  Timing:  Constant  Progression:  Worsening  Chronicity:  Recurrent  Context: not lifting heavy objects, not physical stress and not recent injury    Relieved by:  Nothing  Worsened by:  Nothing  Ineffective treatments:  None tried  Associated symptoms: leg pain    Associated symptoms: no abdominal pain, no bladder incontinence, no bowel incontinence, no fever, no paresthesias, no pelvic pain, no perianal numbness, no tingling and no weakness    Risk factors: lack of exercise    Risk factors: no hx of osteoporosis and no steroid use    Risk factors comment:  History of lumbar fusion remote  Prior to Admission Medications   Prescriptions Last Dose Informant Patient Reported? Taking?    GABAPENTIN PO  Self Yes No   Sig: Take 500 mg by mouth 2 (two) times a day   Multiple Vitamins-Calcium (ONE-A-DAY WOMENS PO)  Self Yes No   Sig: Take 1 tablet by mouth daily     Probiotic Product (PROBIOTIC DAILY PO)  Self Yes No   Sig: Take 1 tablet by mouth daily     cyclobenzaprine (FLEXERIL) 5 mg tablet   No No   Sig: Take 2 tablets by mouth 2 (two) times a day as needed for muscle spasms for up to 2 days   diclofenac (VOLTAREN) 75 mg EC tablet  Self Yes No   Sig: Take 75 mg by mouth 2 (two) times a day   lidocaine (LIDODERM) 5 %   No No   Sig: Place 1 patch on the skin every 24 hours Remove & Discard patch within 12 hours   lidocaine (LIDODERM) 5 %   No No   Sig: Place 1 patch on the skin daily Remove & Discard patch within 12 hours or as directed by MD   naproxen (EC NAPROSYN) 500 MG EC tablet   No No   Sig: Take 1 tablet by mouth 2 (two) times a day with meals   naproxen (NAPROSYN) 500 mg tablet Returned call to Dereje. He stated that one of the aides alerted him that pt's pacemaker was beeping. He said he went in to check, but didn't hear anything. He did a transmission and got a green check mayuri. He's aware pt is scheduled for gen change which I confirmed was on 8/20/24. I mentioned that sometimes it started to beep one it's LUCILA, but I would have device confirm and call pt. He understood.     Device --  Please confirm, so I can return call to pt's nurse, Dereje.   No No   Sig: Take 1 tablet by mouth 2 (two) times a day as needed for mild pain   oxyCODONE (ROXICODONE) 5 mg immediate release tablet   No No   Si-3 tablets (5-15 mg) every 4 hours as needed for pain   promethazine (PHENERGAN) 12 5 MG tablet   No No   Sig: Take 1 tablet by mouth every 6 (six) hours as needed for nausea or vomiting for up to 30 days   traMADol (ULTRAM) 50 mg tablet  Self Yes No   Sig: Take 50 mg by mouth 2 (two) times a day      Facility-Administered Medications: None       Past Medical History:   Diagnosis Date    Anemia     Angina pectoris (HCC)     with anxiety    Anxiety     Arthritis     generalized    Back pain     Carpal tunnel syndrome, bilateral     Chest pain     "from anxiety"    Chronic pain     Herpes     History of anemia     Knee pain, left     Meniscus tear     (L)    Shortness of breath     with anxiety   Varicose veins of legs     Wears glasses        Past Surgical History:   Procedure Laterality Date    ANKLE ARTHROSCOPY W/ INTERNAL FIXATION AND ILIAC CREST BONE GRAFT      BACK SURGERY      CERVICAL BIOPSY       SECTION      OH KNEE SCOPE,MED/LAT MENISECTOMY Left 10/13/2016    Procedure: KNEE ARTHROSCOPY PARTIAL LATERAL MENISCECTOMY CHONDROPLASTY;  Surgeon: Radha Gates MD;  Location: [facility] OR;  Service: Orthopedics       History reviewed  No pertinent family history  I have reviewed and agree with the history as documented  Social History   Substance Use Topics    Smoking status: Current Every Day Smoker     Packs/day: 0 75     Types: Cigarettes    Smokeless tobacco: Never Used    Alcohol use Yes      Comment: rarely        Review of Systems   Constitutional: Negative for fever  HENT: Negative for ear pain  Eyes: Negative for pain  Respiratory: Negative for cough  Gastrointestinal: Negative for abdominal pain and bowel incontinence  Endocrine: Negative for polydipsia     Genitourinary: Negative for bladder incontinence and pelvic pain  Musculoskeletal: Positive for back pain  Skin: Negative for pallor and rash  Allergic/Immunologic: Negative for food allergies  Neurological: Negative for tingling, weakness and paresthesias  Hematological: Negative for adenopathy  Psychiatric/Behavioral: Negative for behavioral problems  Physical Exam  Physical Exam   Constitutional: She is oriented to person, place, and time  She appears well-developed and well-nourished  No distress  Significant emotional distress on exam    HENT:   Head: Normocephalic and atraumatic  Eyes: Conjunctivae are normal    Neck: Neck supple  No JVD present  Cardiovascular: Normal rate  Pulmonary/Chest: Effort normal  She has no wheezes  Abdominal:   Obese abdomen  Nontender  Genitourinary:   Genitourinary Comments: No complaints deferred  Musculoskeletal: She exhibits no edema or deformity  Neurological: She is alert and oriented to person, place, and time  No sensory deficit  Coordination normal    Skin: Skin is warm and dry  Capillary refill takes less than 2 seconds  She is not diaphoretic  Psychiatric: She has a normal mood and affect         Vital Signs  ED Triage Vitals [06/17/18 0236]   Temperature Pulse Respirations Blood Pressure SpO2   97 9 °F (36 6 °C) 75 16 129/58 99 %      Temp Source Heart Rate Source Patient Position - Orthostatic VS BP Location FiO2 (%)   Oral Monitor Sitting Right arm --      Pain Score       Worst Possible Pain           Vitals:    06/17/18 0236   BP: 129/58   Pulse: 75   Patient Position - Orthostatic VS: Sitting       Visual Acuity      ED Medications  Medications   ketorolac (TORADOL) injection 15 mg (15 mg Intravenous Given 6/17/18 0334)   acetaminophen (TYLENOL) tablet 975 mg (975 mg Oral Given 6/17/18 0334)   methocarbamol (ROBAXIN) tablet 500 mg (500 mg Oral Given 6/17/18 0333)   dexamethasone (DECADRON) tablet 6 mg (6 mg Oral Given 6/17/18 4112)       Diagnostic Studies  Results Reviewed None                 XR lumbar spine 2 or 3 views   ED Interpretation by Buddy Yost PA-C (00/69 1457)   Patient with prior history of on lay fusion  Now with right posterior leg pain and right anterior thigh pain  Overall stable appearance from prior image  Procedures  Procedures       Phone Contacts  ED Phone Contact    ED Course                               MDM  Number of Diagnoses or Management Options  Acute back pain:   Chronic back pain:   Radicular syndrome of right leg:   Diagnosis management comments: 53-year-old female presents emergency department for acute on chronic low back pain with increased worsening of her left and right leg pain  Patient states pain radiates to the right buttock but also into the anterior thigh  Patient with prior history of spinal surgery with on lay fusion without pedicle screw fixation  Patient has had x-ray in the all within the last year demonstrating L4 on L5 anterior listhesis  Patient with worsening symptoms will obtain new x-ray  New x-ray was reviewed this day no worsening of her anterior listhesis  At this time will treat conservatively  Educated patient of diagnosis and home management encourage patient to follow up with her known a primary care and/or Sports Medicine group  Patient educated on persistent or worsening signs symptoms weakness of lower extremities bowel or bladder dysfunction and to return to the emergency department  Patient and female significant other admit to understanding and agreement  Patient was discharged in stable condition         Amount and/or Complexity of Data Reviewed  Tests in the radiology section of CPT®: ordered and reviewed      CritCare Time    Disposition  Final diagnoses:   Chronic back pain   Acute back pain   Radicular syndrome of right leg     Time reflects when diagnosis was documented in both MDM as applicable and the Disposition within this note     Time User Action Codes Description Comment 6/17/2018  3:40 AM Stella Sin Add [M54 9,  G89 29] Chronic back pain     6/17/2018  3:41 AM Stella Sin Add [M54 9] Acute back pain     6/17/2018  3:42 AM Stella Sin Add [M54 10] Radicular syndrome of right leg       ED Disposition     ED Disposition Condition Comment    Discharge  Wilfredo Elizabeth discharge to home/self care  Condition at discharge: Stable        Follow-up Information     Follow up With Specialties Details Why Contact Info       Follow-up with your known primary care  Fauzia Shah MD Sports Medicine, [facility]  [phone]            Discharge Medication List as of 6/17/2018  3:45 AM      START taking these medications    Details   acetaminophen (TYLENOL) 500 mg tablet Take 1 tablet (500 mg total) by mouth every 6 (six) hours as needed for mild pain, moderate pain, headaches or fever, Starting Sun 6/17/2018, Print      dexamethasone (DECADRON) 2 mg tablet Take 3 tablets (6 mg total) by mouth daily with breakfast, Starting Sun 6/17/2018, Print      ibuprofen (MOTRIN) 400 mg tablet Take 1 tablet (400 mg total) by mouth every 6 (six) hours as needed for mild pain, Starting Sun 6/17/2018, Print      !! lidocaine (LIDODERM) 5 % Place 1 patch on the skin daily Remove & Discard patch within 12 hours or as directed by MD, Starting Sun 6/17/2018, Print      methocarbamol (ROBAXIN) 750 mg tablet Take 1 tablet (750 mg total) by mouth every 6 (six) hours as needed for muscle spasms, Starting Sun 6/17/2018, Print       !! - Potential duplicate medications found  Please discuss with provider        CONTINUE these medications which have NOT CHANGED    Details   cyclobenzaprine (FLEXERIL) 5 mg tablet Take 2 tablets by mouth 2 (two) times a day as needed for muscle spasms for up to 2 days, Starting Sat 8/19/2017, Until Mon 8/21/2017, Print      diclofenac (VOLTAREN) 75 mg EC tablet Take 75 mg by mouth 2 (two) times a day, Until Discontinued, Historical Med      GABAPENTIN PO Take 500 mg by mouth 2 (two) times a day, Until Discontinued, Historical Med      !! lidocaine (LIDODERM) 5 % Place 1 patch on the skin every 24 hours Remove & Discard patch within 12 hours, Starting Tue 6/20/2017, Print      !! lidocaine (LIDODERM) 5 % Place 1 patch on the skin daily Remove & Discard patch within 12 hours or as directed by MD, Starting Sat 8/19/2017, Print      Multiple Vitamins-Calcium (ONE-A-DAY WOMENS PO) Take 1 tablet by mouth daily  , Until Discontinued, Historical Med      naproxen (EC NAPROSYN) 500 MG EC tablet Take 1 tablet by mouth 2 (two) times a day with meals, Starting Sun 8/20/2017, Print      naproxen (NAPROSYN) 500 mg tablet Take 1 tablet by mouth 2 (two) times a day as needed for mild pain, Starting Tue 6/20/2017, Print      oxyCODONE (ROXICODONE) 5 mg immediate release tablet 1-3 tablets (5-15 mg) every 4 hours as needed for pain, Print      Probiotic Product (PROBIOTIC DAILY PO) Take 1 tablet by mouth daily  , Until Discontinued, Historical Med      promethazine (PHENERGAN) 12 5 MG tablet Take 1 tablet by mouth every 6 (six) hours as needed for nausea or vomiting for up to 30 days, Starting 10/13/2016, Until Sat 11/12/16, Print      traMADol (ULTRAM) 50 mg tablet Take 50 mg by mouth 2 (two) times a day, Until Discontinued, Historical Med       !! - Potential duplicate medications found  Please discuss with provider  No discharge procedures on file      ED Provider  Electronically Signed by           Franklin Sarmiento PA-C  06/17/18 9584

## 2024-08-06 NOTE — TELEPHONE ENCOUNTER
arielle Calabrese, my name is Dereje. I'm one of the nurses calling from [facility] in [place]. I'm calling as regard to one of my patient by name Josiah Weinstein. He's dead of birth is 1960. Again, my name is Dereje. I'm one of the nurses calling from [facility] in [place]. I'm calling I got to one of my residents by name Josiah Weinstein, date of birth 1960 and I would like to discuss something with you guys. My phone number here is [phone]. My phone number here is [phone]. Thank you. Hedy chiang.

## 2024-08-08 ENCOUNTER — TELEPHONE (OUTPATIENT)
Dept: CARDIOLOGY CLINIC | Facility: CLINIC | Age: 64
End: 2024-08-08

## 2024-08-08 NOTE — TELEPHONE ENCOUNTER
Called [facility] & spoke with sandra answered all her questions advised venogram is now scheduled for 8/16/24.

## 2024-08-08 NOTE — TELEPHONE ENCOUNTER
Pt will be undergoing a gen change procedure on 8/20. Jana from [facility] called requesting to know if pt will be going to the IR or OR when they first walk in, as she needs this information prior to scheduling transportation for this pt.      Please advise.      Thank you!        Jana  [phone]

## 2024-08-16 ENCOUNTER — HOSPITAL ENCOUNTER (OUTPATIENT)
Dept: RADIOLOGY | Facility: HOSPITAL | Age: 64
Discharge: HOME | End: 2024-08-16
Attending: RADIOLOGY
Payer: MEDICARE

## 2024-08-16 DIAGNOSIS — I48.21 PERMANENT ATRIAL FIBRILLATION (HCC): ICD-10-CM

## 2024-08-16 PROCEDURE — 36005 INJECTION EXT VENOGRAPHY: CPT

## 2024-08-16 PROCEDURE — NC001 PR NO CHARGE: Performed by: RADIOLOGY

## 2024-08-16 PROCEDURE — 75820 VEIN X-RAY ARM/LEG: CPT

## 2024-08-16 RX ADMIN — IOHEXOL 40 ML: 350 INJECTION, SOLUTION INTRAVENOUS at 11:20

## 2024-08-16 NOTE — DISCHARGE INSTRUCTIONS
Venography/ Venogram      What you Need To Know:  Venography is a test that shows blood flow through a vein.  Contast liquid or CO2 gas is used to help the vein show up in x-rays.  Venography can be used to find current blood flow problems or the effects of a past problem.  Blood flow may be blocked or slowed from inflammation, a tumor, blood clot, or wires from a device.  Venography may be used if other tests cannot find the cause of your health problem.  The results will help your healthcare providers make or change treatment plans.    During the test Contrast will be injected through a catheter or IV.  You may Notice a warmth or burning feeling as the contrast is injected.  This is expected and should pass quickly.  Moving x-ray pictures are taken of blood flow through the vein being checked. Liquid will be put through the catheter/IV to flush the line.  Then the catheter/IV will be removed.  The insertion site is covered with a dry dressing or band aid.     After the test:  You may have some pain or bruising at the catheter/IV site.  This is exptected and should get better in a day or two.     Discharge Instructions:  Call your local emergency number or [phone] if  -you have numbness or tingling in an arm or leg.  -You have any signs of an allergic reaction to the contrast liquid:    Chest pain or trouble breathing  Dizziness or fainting  Swelling of your mouth or face  Nausea or vomitting  Sudden decrease in urination  A rash, itching, or swollen skin       Call your doctor with any questions or concerns about your condition or care.    Care for the catheter/IV site:    * keep the bandage on the catheter site for a few hours up to 1 day.  Then you can remove the bandage. If the area starts bleeding, apply firm pressure for 10 minutes. Use gauze or clean towel to apply  pressure.  And reapply clean dressing or band-aid.  Ice the area to treat swelling, tenderness, or pain at catheter/IV site.    Drink liquids as directed:  liquids will help flush the contrast liquid out of your body.  Ask how much liquid to drink after the venogram. Do not drink extra liquid if you are on dialysis.    Resume diet and activity as usual unless instructed otherwise.      Follow up with your primary doctor and/Or your referring Doctor for follow up visits or procedures.

## 2024-08-20 ENCOUNTER — ANESTHESIA EVENT (OUTPATIENT)
Dept: NON INVASIVE DIAGNOSTICS | Facility: HOSPITAL | Age: 64
End: 2024-08-20
Payer: MEDICARE

## 2024-08-20 ENCOUNTER — HOSPITAL ENCOUNTER (OUTPATIENT)
Facility: HOSPITAL | Age: 64
Setting detail: OUTPATIENT SURGERY
Discharge: HOME | End: 2024-08-20
Attending: INTERNAL MEDICINE | Admitting: INTERNAL MEDICINE
Payer: MEDICARE

## 2024-08-20 ENCOUNTER — ANESTHESIA (OUTPATIENT)
Dept: NON INVASIVE DIAGNOSTICS | Facility: HOSPITAL | Age: 64
End: 2024-08-20
Payer: MEDICARE

## 2024-08-20 VITALS
SYSTOLIC BLOOD PRESSURE: 99 MMHG | OXYGEN SATURATION: 99 % | DIASTOLIC BLOOD PRESSURE: 56 MMHG | TEMPERATURE: 97.1 F | RESPIRATION RATE: 16 BRPM | HEART RATE: 53 BPM

## 2024-08-20 DIAGNOSIS — Z45.02 IMPLANTABLE CARDIOVERTER-DEFIBRILLATOR (ICD) AT END OF BATTERY LIFE: Primary | ICD-10-CM

## 2024-08-20 DIAGNOSIS — I63.419 CEREBROVASCULAR ACCIDENT (CVA) DUE TO EMBOLISM OF MIDDLE CEREBRAL ARTERY, UNSPECIFIED BLOOD VESSEL LATERALITY (HCC): ICD-10-CM

## 2024-08-20 DIAGNOSIS — Z95.810 ICD (IMPLANTABLE CARDIOVERTER-DEFIBRILLATOR) IN PLACE: ICD-10-CM

## 2024-08-20 DIAGNOSIS — I47.20 VENTRICULAR TACHYCARDIA (HCC): ICD-10-CM

## 2024-08-20 DIAGNOSIS — I48.21 PERMANENT ATRIAL FIBRILLATION (HCC): ICD-10-CM

## 2024-08-20 PROCEDURE — NC001 PR NO CHARGE: Performed by: PHYSICIAN ASSISTANT

## 2024-08-20 PROCEDURE — C1721 AICD, DUAL CHAMBER: HCPCS | Performed by: INTERNAL MEDICINE

## 2024-08-20 PROCEDURE — 33263 RMVL & RPLCMT DFB GEN 2 LEAD: CPT | Performed by: INTERNAL MEDICINE

## 2024-08-20 PROCEDURE — 33262 RMVL& REPLC PULSE GEN 1 LEAD: CPT | Performed by: INTERNAL MEDICINE

## 2024-08-20 PROCEDURE — 93005 ELECTROCARDIOGRAM TRACING: CPT

## 2024-08-20 DEVICE — ICD DDPA2D1 COBALT XT DR MRI DF1 USA
Type: IMPLANTABLE DEVICE | Site: CHEST  WALL | Status: FUNCTIONAL
Brand: COBALT™ XT DR MRI SURESCAN™

## 2024-08-20 DEVICE — ENVELOPE CMRM6133 ABSORB LRG MR
Type: IMPLANTABLE DEVICE | Site: CHEST  WALL | Status: FUNCTIONAL
Brand: TYRX™

## 2024-08-20 RX ORDER — CEFAZOLIN SODIUM 2 G/50ML
2000 SOLUTION INTRAVENOUS ONCE
Status: COMPLETED | OUTPATIENT
Start: 2024-08-20 | End: 2024-08-20

## 2024-08-20 RX ORDER — PROPOFOL 10 MG/ML
INJECTION, EMULSION INTRAVENOUS CONTINUOUS PRN
Status: DISCONTINUED | OUTPATIENT
Start: 2024-08-20 | End: 2024-08-20

## 2024-08-20 RX ORDER — MIDAZOLAM HYDROCHLORIDE 2 MG/2ML
INJECTION, SOLUTION INTRAMUSCULAR; INTRAVENOUS AS NEEDED
Status: DISCONTINUED | OUTPATIENT
Start: 2024-08-20 | End: 2024-08-20

## 2024-08-20 RX ORDER — LIDOCAINE HYDROCHLORIDE AND EPINEPHRINE 10; 10 MG/ML; UG/ML
INJECTION, SOLUTION INFILTRATION; PERINEURAL CODE/TRAUMA/SEDATION MEDICATION
Status: DISCONTINUED | OUTPATIENT
Start: 2024-08-20 | End: 2024-08-20 | Stop reason: HOSPADM

## 2024-08-20 RX ORDER — GENTAMICIN 40 MG/ML
INJECTION, SOLUTION INTRAMUSCULAR; INTRAVENOUS CODE/TRAUMA/SEDATION MEDICATION
Status: DISCONTINUED | OUTPATIENT
Start: 2024-08-20 | End: 2024-08-20 | Stop reason: HOSPADM

## 2024-08-20 RX ORDER — FENTANYL CITRATE 50 UG/ML
INJECTION, SOLUTION INTRAMUSCULAR; INTRAVENOUS AS NEEDED
Status: DISCONTINUED | OUTPATIENT
Start: 2024-08-20 | End: 2024-08-20

## 2024-08-20 RX ORDER — ACETAMINOPHEN 325 MG/1
650 TABLET ORAL EVERY 4 HOURS PRN
Status: DISCONTINUED | OUTPATIENT
Start: 2024-08-20 | End: 2024-08-20 | Stop reason: HOSPADM

## 2024-08-20 RX ORDER — LIDOCAINE HYDROCHLORIDE 10 MG/ML
INJECTION, SOLUTION EPIDURAL; INFILTRATION; INTRACAUDAL; PERINEURAL CODE/TRAUMA/SEDATION MEDICATION
Status: DISCONTINUED | OUTPATIENT
Start: 2024-08-20 | End: 2024-08-20 | Stop reason: HOSPADM

## 2024-08-20 RX ORDER — SODIUM CHLORIDE 9 MG/ML
20 INJECTION, SOLUTION INTRAVENOUS CONTINUOUS
Status: DISCONTINUED | OUTPATIENT
Start: 2024-08-20 | End: 2024-08-20

## 2024-08-20 RX ORDER — PROPOFOL 10 MG/ML
INJECTION, EMULSION INTRAVENOUS AS NEEDED
Status: DISCONTINUED | OUTPATIENT
Start: 2024-08-20 | End: 2024-08-20

## 2024-08-20 RX ADMIN — FENTANYL CITRATE 25 MCG: 50 INJECTION INTRAMUSCULAR; INTRAVENOUS at 08:00

## 2024-08-20 RX ADMIN — PROPOFOL 30 MG: 10 INJECTION, EMULSION INTRAVENOUS at 08:00

## 2024-08-20 RX ADMIN — CEFAZOLIN SODIUM 2000 MG: 2 SOLUTION INTRAVENOUS at 07:57

## 2024-08-20 RX ADMIN — PROPOFOL 50 MCG/KG/MIN: 10 INJECTION, EMULSION INTRAVENOUS at 08:00

## 2024-08-20 RX ADMIN — SODIUM CHLORIDE: 0.9 INJECTION, SOLUTION INTRAVENOUS at 07:30

## 2024-08-20 RX ADMIN — MIDAZOLAM 1 MG: 1 INJECTION INTRAMUSCULAR; INTRAVENOUS at 08:00

## 2024-08-20 NOTE — ANESTHESIA POSTPROCEDURE EVALUATION
Post-Op Assessment Note    CV Status:  Stable  Pain Score: 0    Pain management: adequate       Mental Status:  Alert and awake   Hydration Status:  Euvolemic   PONV Controlled:  Controlled   Airway Patency:  Patent     Post Op Vitals Reviewed: Yes    No anethesia notable event occurred.    Staff: CRNA               BP   92/68   Temp   Rn note   Pulse  53   Resp   17   SpO2   95% 4Lo2 NC

## 2024-08-20 NOTE — Clinical Note
The ICD COBALT XT DR MRI IS1 DF1 - [id number] device was inserted. The leads were placed into the connector and visually verified to be in correct position. Injury current obtained.

## 2024-08-20 NOTE — ANESTHESIA PREPROCEDURE EVALUATION
Procedure:  Cardiac icd generator change DUAL CHAMBER (Chest)    Relevant Problems   CARDIO   (+) Mixed hyperlipidemia   (+) Permanent atrial fibrillation (HCC)   (+) Ventricular tachycardia (HCC)      HEMATOLOGY   (+) Thrombocytopenia (HCC)      NEURO/PSYCH   (+) Cerebrovascular accident (CVA) due to embolism of middle cerebral artery (HCC)   (+) Type 2 diabetes mellitus with diabetic neuropathy, unspecified whether long term insulin use (HCC)        Physical Exam    Airway    Mallampati score: II         Dental   No notable dental hx     Cardiovascular      Pulmonary      Other Findings        Anesthesia Plan  ASA Score- 3     Anesthesia Type- IV sedation with anesthesia with ASA Monitors.         Additional Monitors:     Airway Plan:     Comment: I, Dr. Spencer, the attending physician, have personally seen and evaluated the patient prior to anesthetic care.  I have reviewed the pre-anesthetic record, and other medical records if appropriate to the anesthetic care.  If a CRNA is involved in the case, I have reviewed the CRNA assessment, if present, and agree.  The patient is in a suitable condition to proceed with my formulated anesthetic plan.  .       Plan Factors-    Chart reviewed.                      Induction- intravenous.    Postoperative Plan-     Perioperative Resuscitation Plan - Level 1 - Full Code.       Informed Consent- Anesthetic plan and risks discussed with patient.  I personally reviewed this patient with the CRNA. Discussed and agreed on the Anesthesia Plan with the CRNA..

## 2024-08-20 NOTE — INTERVAL H&P NOTE
Please see recent office visit by Divya Larios PA-C for full details.  Brief this patient is a 63-year-old male with [employer] dual-chamber ICD in situ, nonischemic cardiomyopathy with LVEF 40% per echo 11/2023, chronic HFrEF, permanent atrial fibrillation maintained on Eliquis, prior MCA CVA in the setting of medication noncompliance, hyperlipidemia, diabetes mellitus type 2, and thrombocytopenia with stable platelets.  He typically follows with Dr. Smith as an outpatient.  Per reports he has history of VT and this initially underwent ICD implantation 1/2012 with subsequent generator change in 2017.  His device is programmed VVIR due to permanent atrial fibrillation, for which he is maintained on Eliquis anticoagulation.  Through routine device interrogations he was found to have reached LUCILA as of 7/2/2024.  He was seen in our office to discuss treatment options.  At 1 point there was discussion about a repeat echocardiogram with possible upgrade given his reduced LVEF 40% per echo 11/2023, however ultimately was decided that he would undergo simple generator change.  He presents today to undergo the procedure, no significant changes since he was last seen.  Physical exam unchanged, he is nonverbal, difficult to obtain IV..    There were no vitals filed for this visit.

## 2024-08-20 NOTE — DISCHARGE INSTR - AVS FIRST PAGE
Please refer to post ICD implantation discharge instructions and restrictions below and your ICD booklet/temporary card.     Keep incision dry for one week. Leave outer bandage in place for 1 week - it is water proof, and as long as it is fully adhered to your skin you may shower with it.  If it appears as though the bandage is coming off and/or there is any communication to the area of device incision, please then keep the whole area dry for the remaining week.  After 1 week, please remove by pulling all edges away from the center of the bandage. After the bandage is removed, you may then shower normally and get the area wet with soap and water, no scrubbing, and pat dry. Do not use lotions/powders/creams on incision.    Please call the office if you notice redness, swelling, bleeding, or drainage from incision or if you develop fevers.    Implantable Cardioverter Defibrillator      If you have any questions, please call [phone] to speak with a nurse (8:30am-4pm, or [phone] after hours). For appointments, please call [phone].    WHAT YOU SHOULD KNOW:    An implantable cardioverter defibrillator (ICD) is a small device that monitors your heart rate and rhythm. It is commonly placed inside your upper chest region. It may be used if you have a ventricular arrhythmia, which is an irregular, dangerous rhythm from the bottom chamber of your heart. Some arrhythmias may cause your heart to suddenly stop beating. An ICD can give a shock to your heart to make it start beating again, or it can give pacing therapy (also known as pain-free therapy) to return your heart to normal rhythm. It is also a pacemaker, so it will pace your heart if needed to prevent it from beating too slowly.           AFTER YOU LEAVE:      Follow up with your primary healthcare provider or cardiologist as directed: You will need to follow-up to have your ICD checked and make sure you are not having problems. Write down your questions so  you remember to ask them during your visits.    Self-care:   Ask about activity: Ask if you need to avoid moving your shoulder or arm, and for how long. Ask if you should avoid lifting heavy objects. Do not play any contact sports, such as football or wrestling, until your primary healthcare provider ([facility]) or cardiologist tells you it is okay. You may only be able to drive for a certain amount of time per day, or during certain hours. Ask when you can return to work.   Care for your skin over the ICD: see above instructions for further details     When you get a shock from your ICD: A shock may feel like someone has hit you, or you may feel a thump in the chest. If someone is touching you when you get a shock, they may feel a tingling feeling. The first time you feel a shock, it may scare you. Sit or lie down and stay calm. Ask someone to stay with you if possible. Please either call your cardiologist or report to an emergency room.    Driving: you are ok to drive 48 hours after generator is changed     Safety instructions when you have an ICD:   Carry an ID card for the ICD: This card has important information about your ICD.    Stay away from magnets or machines with electric fields: This includes MRI machines. Avoid leaning into a car engine or doing welding. These things can interfere with how your ICD works.    Tell airport security you have an ICD: You may need to be searched by hand when you go through a security gate. The security gate or handheld wand could harm your ICD.    Keep an ICD diary: Record when you get a shock and what you were doing before you got the shock. Keep track of how you felt before and after the shock, as well as how many shocks you received. Write down the day and time of each shock. Bring the diary with you when you see your PHP or cardiologist.   Carry medical alert identification: Wear medical alert jewelry or carry a card that says you have an ICD. Ask your PHP or cardiologist  where to get these items.    Contact your primary healthcare provider or cardiologist if:   You have a fever.    You feel 1 or more shocks from your ICD and feel fine afterwards.   Your feet or ankles swell.   The area around your ICD is painful or tender after surgery.   The skin around your stitches or staples is red, swollen, or draining pus or fluid.    You have chills, a cough, and feel weak or achy.    You are sad or anxious and find it hard to do your usual activities.   You have questions or concerns about your condition or care.    Seek care immediately or call 911 if:   Your stitches or staples come apart.   Blood soaks through your bandage.   You feel more than 3 shocks in a row from your ICD.   You become weak, dizzy, or faint.   You feel your heart skip beats or beat very fast or slow, but you do not feel a shock from your ICD.   You have chest pain that does not go away with rest or medicine.        © 2014 [employer]. Information is for End User's use only and may not be sold, redistributed or otherwise used for commercial purposes. All illustrations and images included in CareNotes® are the copyrighted property of [employer], [employer]. or [employer].  The above information is an  only. It is not intended as medical advice for individual conditions or treatments. Talk to your doctor, nurse or pharmacist before following any medical regimen to see if it is safe and effective for you.

## 2024-08-20 NOTE — PROGRESS NOTES
Please note that he is maintained on beta-blocker therapy, but is not currently on ACEi/ARB/ARNi due to baseline low blood pressure.

## 2024-08-22 LAB
ATRIAL RATE: 74 BPM
QRS AXIS: -43 DEGREES
QRSD INTERVAL: 112 MS
QT INTERVAL: 442 MS
QTC INTERVAL: 455 MS
T WAVE AXIS: -6 DEGREES
VENTRICULAR RATE: 64 BPM

## 2024-08-22 PROCEDURE — 93010 ELECTROCARDIOGRAM REPORT: CPT | Performed by: INTERNAL MEDICINE

## 2024-09-06 ENCOUNTER — IN-CLINIC DEVICE VISIT (OUTPATIENT)
Dept: CARDIOLOGY CLINIC | Facility: CLINIC | Age: 64
End: 2024-09-06

## 2024-09-06 DIAGNOSIS — Z95.810 PRESENCE OF AUTOMATIC CARDIOVERTER/DEFIBRILLATOR (AICD): Primary | ICD-10-CM

## 2024-09-06 PROCEDURE — 99024 POSTOP FOLLOW-UP VISIT: CPT | Performed by: STUDENT IN AN ORGANIZED HEALTH CARE EDUCATION/TRAINING PROGRAM

## 2024-09-06 NOTE — PROGRESS NOTES
Results for orders placed or performed in visit on 09/06/24   Cardiac EP device report    Narrative    MDT-DUAL CHAMBER ICD (VVIR MODE)/ ACTIVE SYSTEM IS MRI CONDITIONAL  DEVICE INTERROGATED IN THE [place] OFFICE. BATTERY VOLTAGE ADEQUATE. (13.6 YRS)  33%. ALL LEAD PARAMETERS WITHIN NORMAL LIMITS. NO SIGNIFICANT HIGH RATE EPISODES. CURRENTLY IN AF. PATIENT IS ON ELIQUIS AND METOPROLOL. NORMAL DEVICE FUNCTION.  WOUND CHECK: INCISION CLEAN AND DRY WITH EDGES APPROXIMATED; AQUACELL REMOVED; WOUND CARE AND RESTRICTIONS REVIEWED WITH PATIENT.--MORIN

## 2024-11-25 NOTE — PROGRESS NOTES
Cardiology Consultation     Js Barrera  [id number]  1960  [facility] [place]  [address] [address]      1. Permanent atrial fibrillation (HCC)        2. Ventricular tachycardia (HCC)        3. Presence of automatic cardioverter/defibrillator (AICD)        4. Alcoholic cardiomyopathy (HCC)        5. Cerebrovascular accident (CVA) due to embolism of middle cerebral artery, unspecified blood vessel laterality (HCC)        6. Atherosclerosis of arteries of extremities (HCC)        7. Thrombocytopenia (HCC)        8. Chronic systolic CHF (congestive heart failure) (HCC)        9. Mixed hyperlipidemia                  Discussion/Summary:  Nonischemic dilated cardiomyopathy   -presumed to be alcoholic cardiomyopathy  -prior alcohol abuse before CVA  -TTE 08/03/2017 EF 50-55%, moderate LA, mild to moderate RA, mild-to-moderate MR, mild TR  -TTE 11/22/2022 showed EF 40%, mildly dilated RV with mildly reduced function, moderate LA and RA, mild to moderate MR, mild TR  - Not on diuretics currently  - Appears euvolemic on exam  Ventricular tachycardia   -s/p Medtronic dual chamber ICD  -previously on amiodarone, however discontinued years ago and has not had significant VT  - Underwent generator change on 8/20/2024  - Last device report 9/10/2024 showed normal device function  Permanent atrial fibrillation  -Continue with Lopressor 25 mg b.i.d.  -anticoagulated on warfarin  -rate well controlled  Hyperlipidemia   -Continue with atorvastatin 10 mg daily  -Lipid profile 10/21/2024 showed total cholesterol 59, triglycerides 44, HDL 33, LDL 17  Prior CVA to [facility]  -in setting of atrial fibrillation and patient noncompliant with warfarin  -residual left-sided weakness and patient also nonverbal  Thrombocytopenia   -platelet count 96 on CBC on 10/21/2024  -Counts remain relatively stable around 100  Type 2 diabetes   -hemoglobin A1c 6.1 on 12/22/2021    Follow-up in 9 months.    History of  Present Illness:  Js Barrera is a 64 y.o. year old male with a past medical history of alcoholic dilated cardiomyopathy, permanent atrial fibrillation, VT s/p ICD, hyperlipidemia, type 2 diabetes, thrombocytopenia and prior CVA to [facility].      Patient is nonverbal, but was able to nod yes or no to questions.  He is accompanied by his .  She does not care from at his facility and is unaware of any problems or complaints.  Patient knowledges feeling okay and not having any chest pain, shortness of breath, leg swelling, or pain at this time.      Patient Active Problem List   Diagnosis    Alcoholic cardiomyopathy (HCC)    Permanent atrial fibrillation (HCC)    ICD (implantable cardioverter-defibrillator) in place    Ventricular tachycardia (HCC)    Mixed hyperlipidemia    Thrombocytopenia (HCC)    Cerebrovascular accident (CVA) due to embolism of middle cerebral artery (HCC)    Atherosclerosis of arteries of extremities (HCC)    Chronic systolic CHF (congestive heart failure) (HCC)    Type 2 diabetes mellitus with diabetic neuropathy, unspecified whether long term insulin use (HCC)    Implantable cardioverter-defibrillator (ICD) at end of battery life     Past Medical History:   Diagnosis Date    Atrial fibrillation (HCC)     Bipolar affective disorder (HCC)     Cardiomyopathy (HCC)     Depression     Diabetes mellitus (HCC)     Dysphagia     Hyperlipidemia     Hypertension     ICD (implantable cardioverter-defibrillator) in place     Pneumothorax     Stroke (HCC)     Upper GI bleed     Urinary incontinence      Social History     Socioeconomic History    Marital status:      Spouse name: Not on file    Number of children: Not on file    Years of education: Not on file    Highest education level: Not on file   Occupational History    Not on file   Tobacco Use    Smoking status: Former    Smokeless tobacco: Never   Substance and Sexual Activity    Alcohol use: No     Comment: former abuse    Drug use:  No    Sexual activity: Not on file   Other Topics Concern    Not on file   Social History Narrative    Not on file     Social Drivers of Health     Financial Resource Strain: Not on file   Food Insecurity: Not on file   Transportation Needs: Not on file   Physical Activity: Not on file   Stress: Not on file   Social Connections: Not on file   Intimate Partner Violence: Not on file   Housing Stability: Not on file      Family History   Problem Relation Age of Onset    Coronary artery disease Family      Past Surgical History:   Procedure Laterality Date    CARDIAC DEFIBRILLATOR PLACEMENT      CARDIAC ELECTROPHYSIOLOGY PROCEDURE N/A 8/20/2024    Procedure: Cardiac icd generator change DUAL CHAMBER;  Surgeon: Erick Goel MD;  Location: [facility];  Service: Cardiology    IR UPPER EXTREMITY VENOGRAM- DIAGNOSTIC  8/16/2024    IVC FILTER INSERTION      KNEE SURGERY      UMBILICAL HERNIA REPAIR         Current Outpatient Medications:     acetaminophen (TYLENOL) 325 mg tablet, Take 2 tablets by mouth every 6 (six) hours as needed for mild pain, Disp: 30 tablet, Rfl: 0    apixaban (Eliquis) 5 mg, Take 5 mg by mouth 2 (two) times a day, Disp: , Rfl:     atorvastatin (LIPITOR) 10 mg tablet, 0.5 tablets (5 mg total) by Per G Tube route daily, Disp: , Rfl:     bisacodyl (FLEET) 10 MG/30ML ENEM, Insert 10 mg into the rectum if needed for constipation, Disp: , Rfl:     gabapentin (NEURONTIN) 250 mg/5 mL solution, 6 mL by Per G Tube route daily, Disp: , Rfl:     magnesium hydroxide (MILK OF MAGNESIA) 2400 mg/10 mL SUSP concentrated oral suspension, Take by mouth, Disp: , Rfl:     metFORMIN (FORTAMET) 500 MG (OSM) 24 hr tablet, Take 250 mg by mouth in the morning Give per G tube, Disp: , Rfl:     metoprolol tartrate (LOPRESSOR) 25 mg tablet, 25 mg by Per G Tube route every 12 (twelve) hours, Disp: , Rfl:     omeprazole (PriLOSEC) 20 mg delayed release capsule, Take 10 mg by mouth daily, Disp: , Rfl:     chlorhexidine  "(PERIDEX) 0.12 % solution, , Disp: , Rfl:   No Known Allergies      Labs:  Lab Results   Component Value Date    ALT 16 08/07/2024    AST 18 08/07/2024    BUN 16 10/21/2024    CALCIUM 8.9 10/21/2024     10/21/2024    CHOL 135 07/21/2014    CO2 28 10/21/2024    CREATININE 0.68 10/21/2024    HDL 44 07/21/2014    HCT 45 08/14/2017    HGB 15.3 08/14/2017    HGBA1C 6.4 (H) 10/21/2024    MG 1.9 10/21/2024     (L) 08/14/2017    K 4.3 10/21/2024     07/21/2014    TRIG 214 07/21/2014    WBC 4.81 08/14/2017       Imaging: No results found.      Review of Systems:  Review of Systems   Unable to perform ROS: Patient nonverbal     EP device report 08/26/2022 Medtronic dual chamber ICD   CARELINK TRANSMISSION: BATTERY STATUS \"1.6 YRS.\"  73%. ALL AVAILABLE LEAD PARAMETERS WITHIN NORMAL LIMITS. NO SIGNIFICANT HIGH RATE EPISODES. OPTI-VOL WITHIN NORMAL LIMITS. NORMAL DEVICE FUNCTION. NC    Vitals:    11/26/24 0828   BP: 136/80   Pulse: 68          Vitals:    11/26/24 0828   Weight: 99.3 kg (218 lb 14.4 oz)               Physical Exam:  General appearance:  Appears stated age, alert, well appearing and in no distress  HEENT:  PERRLA, EOMI, no scleral icterus, no conjunctival pallor  NECK:  Supple, No elevated JVP, no thyromegaly, no carotid bruits  HEART:  Regular rate and rhythm, normal S1/S2, no S3/S4, no murmur or rub  LUNGS: Poor inspiratory effort, grossly clear to auscultation bilaterally  ABDOMEN:  Soft, non-tender, positive bowel sounds, no rebound or guarding, no organomegaly   EXTREMITIES:  No edema  VASCULAR:  Normal pedal pulses   SKIN: No lesions or rashes on exposed skin  NEURO:  CN II-XII intact, left upper extremity paralysis, left lower extremity weakness from prior CVA    "

## 2024-11-26 ENCOUNTER — OFFICE VISIT (OUTPATIENT)
Dept: CARDIOLOGY CLINIC | Facility: CLINIC | Age: 64
End: 2024-11-26
Payer: MEDICARE

## 2024-11-26 VITALS
BODY MASS INDEX: 28.11 KG/M2 | HEART RATE: 68 BPM | DIASTOLIC BLOOD PRESSURE: 80 MMHG | WEIGHT: 218.9 LBS | SYSTOLIC BLOOD PRESSURE: 136 MMHG

## 2024-11-26 DIAGNOSIS — I47.20 VENTRICULAR TACHYCARDIA (HCC): ICD-10-CM

## 2024-11-26 DIAGNOSIS — E78.2 MIXED HYPERLIPIDEMIA: ICD-10-CM

## 2024-11-26 DIAGNOSIS — I63.419 CEREBROVASCULAR ACCIDENT (CVA) DUE TO EMBOLISM OF MIDDLE CEREBRAL ARTERY, UNSPECIFIED BLOOD VESSEL LATERALITY (HCC): ICD-10-CM

## 2024-11-26 DIAGNOSIS — Z95.810 PRESENCE OF AUTOMATIC CARDIOVERTER/DEFIBRILLATOR (AICD): ICD-10-CM

## 2024-11-26 DIAGNOSIS — I42.6 ALCOHOLIC CARDIOMYOPATHY (HCC): ICD-10-CM

## 2024-11-26 DIAGNOSIS — I70.209 ATHEROSCLEROSIS OF ARTERIES OF EXTREMITIES (HCC): ICD-10-CM

## 2024-11-26 DIAGNOSIS — I48.21 PERMANENT ATRIAL FIBRILLATION (HCC): Primary | ICD-10-CM

## 2024-11-26 DIAGNOSIS — I50.22 CHRONIC SYSTOLIC CHF (CONGESTIVE HEART FAILURE) (HCC): ICD-10-CM

## 2024-11-26 DIAGNOSIS — D69.6 THROMBOCYTOPENIA (HCC): ICD-10-CM

## 2024-11-26 PROCEDURE — G2211 COMPLEX E/M VISIT ADD ON: HCPCS | Performed by: STUDENT IN AN ORGANIZED HEALTH CARE EDUCATION/TRAINING PROGRAM

## 2024-11-26 PROCEDURE — 99214 OFFICE O/P EST MOD 30 MIN: CPT | Performed by: STUDENT IN AN ORGANIZED HEALTH CARE EDUCATION/TRAINING PROGRAM

## 2024-12-17 ENCOUNTER — REMOTE DEVICE CLINIC VISIT (OUTPATIENT)
Dept: CARDIOLOGY CLINIC | Facility: CLINIC | Age: 64
End: 2024-12-17
Payer: MEDICARE

## 2024-12-17 ENCOUNTER — RESULTS FOLLOW-UP (OUTPATIENT)
Dept: CARDIOLOGY CLINIC | Facility: CLINIC | Age: 64
End: 2024-12-17

## 2024-12-17 DIAGNOSIS — Z95.810 AICD (AUTOMATIC CARDIOVERTER/DEFIBRILLATOR) PRESENT: Primary | ICD-10-CM

## 2024-12-17 PROCEDURE — 93296 REM INTERROG EVL PM/IDS: CPT | Performed by: STUDENT IN AN ORGANIZED HEALTH CARE EDUCATION/TRAINING PROGRAM

## 2024-12-17 PROCEDURE — 93295 DEV INTERROG REMOTE 1/2/MLT: CPT | Performed by: STUDENT IN AN ORGANIZED HEALTH CARE EDUCATION/TRAINING PROGRAM

## 2024-12-17 NOTE — PROGRESS NOTES
"Results for orders placed or performed in visit on 12/17/24   Cardiac EP device report    Narrative    MDT-DUAL CHAMBER ICD (VVIR MODE)/ ACTIVE SYSTEM IS MRI CONDITIONAL  CARELINK TRANSMISSION: BATTERY STATUS \"11 YRS.\"  30%. ALL AVAILABLE LEAD PARAMETERS WITHIN NORMAL LIMITS. NO SIGNIFICANT HIGH RATE EPISODES. OPTI-VOL WITHIN NORMAL LIMITS. NORMAL DEVICE FUNCTION. NC         "

## 2025-03-18 ENCOUNTER — REMOTE DEVICE CLINIC VISIT (OUTPATIENT)
Dept: CARDIOLOGY CLINIC | Facility: CLINIC | Age: 65
End: 2025-03-18
Payer: MEDICARE

## 2025-03-18 DIAGNOSIS — Z95.810 ICD (IMPLANTABLE CARDIOVERTER-DEFIBRILLATOR) IN PLACE: Primary | ICD-10-CM

## 2025-03-18 PROCEDURE — 93295 DEV INTERROG REMOTE 1/2/MLT: CPT | Performed by: INTERNAL MEDICINE

## 2025-03-18 PROCEDURE — 93296 REM INTERROG EVL PM/IDS: CPT | Performed by: INTERNAL MEDICINE

## 2025-03-18 NOTE — PROGRESS NOTES
MDT-DUAL CHAMBER ICD (VVIR MODE)/ ACTIVE SYSTEM IS MRI CONDITIONAL   CARELINK TRANSMISSION: BATTERY VOLTAGE ADEQUATE (12 YR).  21.4% (VVI 50 PPM). ALL AVAILABLE LEAD PARAMETERS WITHIN NORMAL LIMITS. 1 VT-NS EPISODE, 15 BEATS @  BPM (NO THERAPY). CHRONIC AF &  PATIENT TAKING ELIQUIS, METOPROLOL TART. OPTI-VOL WITHIN NORMAL LIMITS. NORMAL DEVICE FUNCTION.   ES

## 2025-03-19 ENCOUNTER — RESULTS FOLLOW-UP (OUTPATIENT)
Dept: NON INVASIVE DIAGNOSTICS | Facility: HOSPITAL | Age: 65
End: 2025-03-19

## 2025-03-25 ENCOUNTER — TELEPHONE (OUTPATIENT)
Dept: CARDIOLOGY CLINIC | Facility: CLINIC | Age: 65
End: 2025-03-25

## 2025-06-17 ENCOUNTER — REMOTE DEVICE CLINIC VISIT (OUTPATIENT)
Dept: CARDIOLOGY CLINIC | Facility: CLINIC | Age: 65
End: 2025-06-17
Payer: MEDICARE

## 2025-06-17 DIAGNOSIS — Z95.810 AICD (AUTOMATIC CARDIOVERTER/DEFIBRILLATOR) PRESENT: Primary | ICD-10-CM

## 2025-06-17 PROCEDURE — 93296 REM INTERROG EVL PM/IDS: CPT | Performed by: STUDENT IN AN ORGANIZED HEALTH CARE EDUCATION/TRAINING PROGRAM

## 2025-06-17 PROCEDURE — 93295 DEV INTERROG REMOTE 1/2/MLT: CPT | Performed by: STUDENT IN AN ORGANIZED HEALTH CARE EDUCATION/TRAINING PROGRAM

## 2025-06-17 NOTE — PROGRESS NOTES
Results for orders placed or performed in visit on 06/17/25   Cardiac EP device report    Narrative    MDT-DUAL CHAMBER ICD (VVIR MODE)/ ACTIVE SYSTEM IS MRI CONDITIONAL  CARELINK TRANSMISSION: BATTERY VOLTAGE ADEQUATE (11.8 YRS). -31%. ALL AVAILABLE LEAD PARAMETERS WITHIN NORMAL LIMITS. NO SIGNIFICANT HIGH RATE EPISODES. PT IN CHRONIC AF & ON ELIQUIS & METOPROLOL. OPTI-VOL WITHIN NORMAL LIMITS. NORMAL DEVICE FUNCTION. GV

## 2025-06-18 ENCOUNTER — RESULTS FOLLOW-UP (OUTPATIENT)
Dept: CARDIOLOGY CLINIC | Facility: CLINIC | Age: 65
End: 2025-06-18

## 2025-08-01 ENCOUNTER — OFFICE VISIT (OUTPATIENT)
Dept: CARDIOLOGY CLINIC | Facility: CLINIC | Age: 65
End: 2025-08-01
Payer: MEDICARE

## 2025-08-01 VITALS
OXYGEN SATURATION: 99 % | BODY MASS INDEX: 28.34 KG/M2 | WEIGHT: 220.8 LBS | DIASTOLIC BLOOD PRESSURE: 70 MMHG | HEIGHT: 74 IN | HEART RATE: 65 BPM | SYSTOLIC BLOOD PRESSURE: 120 MMHG

## 2025-08-01 DIAGNOSIS — I42.6 ALCOHOLIC CARDIOMYOPATHY (HCC): Primary | ICD-10-CM

## 2025-08-01 PROCEDURE — G2211 COMPLEX E/M VISIT ADD ON: HCPCS | Performed by: PHYSICIAN ASSISTANT

## 2025-08-01 PROCEDURE — 99214 OFFICE O/P EST MOD 30 MIN: CPT | Performed by: PHYSICIAN ASSISTANT

## (undated) DEVICE — PLASMABLADE PS200-040 4.0: Brand: PLASMABLADE™

## (undated) DEVICE — AVITENE MICROFIBRILLAR COLLAGEN HEMOSTAT FLOUR: Brand: AVITENE FLOUR